# Patient Record
Sex: MALE | Race: WHITE | NOT HISPANIC OR LATINO
[De-identification: names, ages, dates, MRNs, and addresses within clinical notes are randomized per-mention and may not be internally consistent; named-entity substitution may affect disease eponyms.]

---

## 2018-08-20 PROBLEM — Z00.00 ENCOUNTER FOR PREVENTIVE HEALTH EXAMINATION: Status: ACTIVE | Noted: 2018-08-20

## 2018-08-22 ENCOUNTER — APPOINTMENT (OUTPATIENT)
Dept: ORTHOPEDIC SURGERY | Facility: CLINIC | Age: 63
End: 2018-08-22
Payer: COMMERCIAL

## 2018-08-22 VITALS
DIASTOLIC BLOOD PRESSURE: 79 MMHG | HEIGHT: 67 IN | WEIGHT: 220 LBS | HEART RATE: 69 BPM | BODY MASS INDEX: 34.53 KG/M2 | SYSTOLIC BLOOD PRESSURE: 124 MMHG

## 2018-08-22 DIAGNOSIS — S39.012A STRAIN OF MUSCLE, FASCIA AND TENDON OF LOWER BACK, INITIAL ENCOUNTER: ICD-10-CM

## 2018-08-22 DIAGNOSIS — M47.817 SPONDYLOSIS W/OUT MYELOPATHY OR RADICULOPATHY, LUMBOSACRAL REGION: ICD-10-CM

## 2018-08-22 PROCEDURE — 72100 X-RAY EXAM L-S SPINE 2/3 VWS: CPT

## 2018-08-22 PROCEDURE — 99203 OFFICE O/P NEW LOW 30 MIN: CPT

## 2021-03-08 ENCOUNTER — APPOINTMENT (OUTPATIENT)
Dept: ORTHOPEDIC SURGERY | Facility: CLINIC | Age: 66
End: 2021-03-08
Payer: MEDICARE

## 2021-03-08 DIAGNOSIS — M19.049 PRIMARY OSTEOARTHRITIS, UNSPECIFIED HAND: ICD-10-CM

## 2021-03-08 PROCEDURE — 20550 NJX 1 TENDON SHEATH/LIGAMENT: CPT | Mod: LT

## 2021-03-08 PROCEDURE — 73130 X-RAY EXAM OF HAND: CPT | Mod: LT

## 2021-03-08 PROCEDURE — 99214 OFFICE O/P EST MOD 30 MIN: CPT | Mod: 25

## 2021-03-08 RX ORDER — TAMSULOSIN HYDROCHLORIDE 0.4 MG/1
0.4 CAPSULE ORAL
Qty: 90 | Refills: 0 | Status: ACTIVE | COMMUNITY
Start: 2020-08-06

## 2021-03-08 RX ORDER — LINACLOTIDE 145 UG/1
145 CAPSULE, GELATIN COATED ORAL
Qty: 90 | Refills: 0 | Status: ACTIVE | COMMUNITY
Start: 2020-10-08

## 2021-03-08 RX ORDER — BUPROPION HYDROCHLORIDE 300 MG/1
300 TABLET, EXTENDED RELEASE ORAL
Qty: 90 | Refills: 0 | Status: ACTIVE | COMMUNITY
Start: 2020-07-03

## 2021-03-08 RX ORDER — DULAGLUTIDE 1.5 MG/.5ML
1.5 INJECTION, SOLUTION SUBCUTANEOUS
Qty: 6 | Refills: 0 | Status: ACTIVE | COMMUNITY
Start: 2020-12-21

## 2021-03-08 RX ORDER — CLONIDINE 0.1 MG/24H
0.1 PATCH, EXTENDED RELEASE TRANSDERMAL
Qty: 12 | Refills: 0 | Status: ACTIVE | COMMUNITY
Start: 2020-01-09

## 2021-03-08 RX ORDER — FINASTERIDE 5 MG/1
5 TABLET, FILM COATED ORAL
Qty: 90 | Refills: 0 | Status: ACTIVE | COMMUNITY
Start: 2020-08-06

## 2021-03-08 RX ORDER — SILDENAFIL 100 MG/1
100 TABLET, FILM COATED ORAL
Qty: 18 | Refills: 0 | Status: ACTIVE | COMMUNITY
Start: 2020-08-06

## 2021-03-12 NOTE — PHYSICAL EXAM
[de-identified] : Patient is WDWN, alert, and in no acute distress. Breathing is unlabored. He is grossly oriented to person, place, \par and time. \par \par Left Wrist:Tender over radial styloid\par FROM\par Normal sensation\par Positive Finkelstein test\par  [de-identified] : AP, lateral and oblique views of the left wrist were obtained today and revealed no abnormalities. No acute fracture. No dislocation. Mild CMC arthritis.

## 2021-03-12 NOTE — HISTORY OF PRESENT ILLNESS
[Right] : right hand dominant [FreeTextEntry1] : DYLAN LE is a RHD 65 year male who presents for initial evaluation of left wrist pain x 3-4 weeks.  The pain is in the radial portion of the wrist.  It is painful to twist anything.  It is tender to touch.  He has pain with gripping. He has difficult lifting anything heavy. He reports pain in the thumb that has persisted for the last few weeks. Patient confirms DM.

## 2021-03-12 NOTE — DISCUSSION/SUMMARY
[FreeTextEntry1] : The underlying pathophysiology was reviewed with the patient. XR films were reviewed with the patient. Discussed at length the nature of the patient’s condition. Their left wrist symptoms appear secondary to decor veins tendonitis.\par \par Treatment options were discussed including; observation, NSAIDS, analgesics, injection(s) and bracing\par \par   The patient wishes to proceed with a cortisone injection at this time. Under sterile precautions, an injection of  0.5 cc 1% lidocaine with 0.25 cc of Kenalog and 0.25 cc of Dexamethasone was administered into the left first extensor compartment.  The patient tolerated the procedure well. Rest and apply ice.  The patient was advised to soak the hand in warm water and Epsom salts.  Topical analgesics as needed.  NSAIDs as tolerated.\par \par Patient can continue activities as tolerated. All questions answered, understanding verbalized. Patient in agreement with plan of care.

## 2021-12-17 ENCOUNTER — APPOINTMENT (OUTPATIENT)
Dept: ORTHOPEDIC SURGERY | Facility: CLINIC | Age: 66
End: 2021-12-17

## 2021-12-21 ENCOUNTER — NON-APPOINTMENT (OUTPATIENT)
Age: 66
End: 2021-12-21

## 2021-12-22 ENCOUNTER — APPOINTMENT (OUTPATIENT)
Dept: ORTHOPEDIC SURGERY | Facility: CLINIC | Age: 66
End: 2021-12-22
Payer: MEDICARE

## 2021-12-22 VITALS — WEIGHT: 211 LBS | HEART RATE: 70 BPM | BODY MASS INDEX: 33.12 KG/M2 | OXYGEN SATURATION: 95 % | HEIGHT: 67 IN

## 2021-12-22 DIAGNOSIS — M99.08 SEGMENTAL AND SOMATIC DYSFUNCTION OF RIB CAGE: ICD-10-CM

## 2021-12-22 PROCEDURE — 71100 X-RAY EXAM RIBS UNI 2 VIEWS: CPT | Mod: RT

## 2021-12-22 PROCEDURE — 99214 OFFICE O/P EST MOD 30 MIN: CPT

## 2021-12-22 RX ORDER — LIDOCAINE 4 G/100G
4 PATCH TOPICAL
Qty: 14 | Refills: 0 | Status: ACTIVE | COMMUNITY
Start: 2021-12-22 | End: 1900-01-01

## 2022-09-15 ENCOUNTER — APPOINTMENT (OUTPATIENT)
Dept: ORTHOPEDIC SURGERY | Facility: CLINIC | Age: 67
End: 2022-09-15

## 2022-09-15 PROCEDURE — 20550 NJX 1 TENDON SHEATH/LIGAMENT: CPT

## 2022-09-15 PROCEDURE — 99213 OFFICE O/P EST LOW 20 MIN: CPT | Mod: 25

## 2022-09-15 NOTE — ADDENDUM
[FreeTextEntry1] : I, Nelda Cox wrote this note acting as a scribe for Dr. Sanchez Romano on Sep 15, 2022.

## 2022-09-15 NOTE — DISCUSSION/SUMMARY
[FreeTextEntry1] : The underlying pathophysiology was reviewed with the patient. Discussed at length the nature of the patient’s condition. 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 The skin was prepped with alcohol and sprayed with Ethyl Chloride. An injection of 0.5 cc 1% Lidocaine without epinephrine, 0.25 cc Kenalog 40 mg, and 0.25 cc Dexamethasone was administered into the LEFT first extensor compartment (2/3). The patient tolerated the procedure well. Rest and apply ice. \par As a diabetic, he understands that this will likely elevate his blood sugars in the short-term.\par \par All questions answered, understanding verbalized. Patient in agreement with plan of care. Follow up as needed.

## 2022-09-15 NOTE — PHYSICAL EXAM
[de-identified] : Patient is WDWN, alert, and in no acute distress. Breathing is unlabored. He is grossly oriented to person, place, and time.\par \par He is accompanied by his wife today.\par \par Left Wrist:\par There is tenderness over the first dorsal compartment at the level of the radial styloid. Swelling is localized to the area. There are no visible deformities. The ROM is full in all planes with no crepitus. There is no joint instability on provocative testing. Sensation is normal.\par Range of Motion: Pain is elicited with resisted radial deviation of the wrist. \par Tests/Signs: Finkelstein's test is positive. [de-identified] : no imaging today

## 2022-09-15 NOTE — HISTORY OF PRESENT ILLNESS
[Right] : right hand dominant [FreeTextEntry1] : Patient is a 67 year old male who presents with complaints of left wrist pain which has been ongoing for about 1-/12 years. He was initially treated in the office on 3/8/21 with a cortisone injection to the left first dorsal compartment. He got relief from the injection until recently. He would like another injection today. \par \par He has a medical history of Type II Diabetes.

## 2022-09-15 NOTE — END OF VISIT
[FreeTextEntry3] : All medical record entries made by the Scribe were at my,  Dr. Sanchez Romano MD., direction and personally dictated by me on 09/15/2022. I have personally reviewed the chart and agree that the record accurately reflects my personal performance of the history, physical exam, assessment and plan.

## 2023-07-27 ENCOUNTER — NON-APPOINTMENT (OUTPATIENT)
Age: 68
End: 2023-07-27

## 2023-07-31 ENCOUNTER — APPOINTMENT (OUTPATIENT)
Dept: ORTHOPEDIC SURGERY | Facility: CLINIC | Age: 68
End: 2023-07-31
Payer: MEDICARE

## 2023-07-31 DIAGNOSIS — M65.4 RADIAL STYLOID TENOSYNOVITIS [DE QUERVAIN]: ICD-10-CM

## 2023-07-31 PROCEDURE — 20550 NJX 1 TENDON SHEATH/LIGAMENT: CPT | Mod: LT

## 2023-07-31 NOTE — PHYSICAL EXAM
[de-identified] : Patient is WDWN, alert, and in no acute distress. Breathing is unlabored. He is grossly oriented to person, place, and time.\par  \par  He is accompanied by his wife today.\par  \par  Left Wrist:\par  There is tenderness over the first dorsal compartment at the level of the radial styloid. Swelling is localized to the area. There are no visible deformities. The ROM is full in all planes with no crepitus. There is no joint instability on provocative testing. Sensation is normal.\par  Range of Motion: Pain is elicited with resisted radial deviation of the wrist. \par  Tests/Signs: Finkelstein's test is positive. [de-identified] : no imaging today

## 2023-07-31 NOTE — ADDENDUM
[FreeTextEntry1] : I, Nelda Cox wrote this note acting as a scribe for Dr. Sanchez Romano on Jul 31, 2023.

## 2023-07-31 NOTE — HISTORY OF PRESENT ILLNESS
[Right] : right hand dominant [FreeTextEntry1] : Patient is a 68 year old male who presents with complaints of left wrist pain which has been ongoing for about 2 years. He was initially treated in the office on 3/8/21 with a cortisone injection to the left first dorsal compartment and then a repeat cortisone injection (#2) on 9/15/22. He returns on 7/31/23 and is having recurrent pain and symptoms as of 2 weeks ago. He would like another cortisone injection today.   He has a medical history of Type II Diabetes.

## 2023-07-31 NOTE — END OF VISIT
[FreeTextEntry3] : All medical record entries made by the Scribe were at my,  Dr. Sanchez Romano MD., direction and personally dictated by me on 07/31/2023. I have personally reviewed the chart and agree that the record accurately reflects my personal performance of the history, physical exam, assessment and plan.

## 2023-07-31 NOTE — DISCUSSION/SUMMARY
[FreeTextEntry1] : The underlying pathophysiology was reviewed with the patient. Discussed at length the nature of the patient's condition. The left wrist symptoms appear secondary to De Quervain's tendinitis.  At this time, given the recurrence of his symptoms at the left wrist, he opted to proceed with a repeat cortisone injection at the first dorsal compartment. He understands that as this is his third cortisone injection, if his symptoms recur, further treatment options will consist of surgical release. I explained to him that there is a lifetime limit of 3 cortisone injections per wrist along the first dorsal compartment.  The patient wishes to proceed with a cortisone injection at this time. The skin was prepped with alcohol and sprayed with Ethyl Chloride. An injection of 0.5 cc 1% Lidocaine without epinephrine, 0.25 cc Kenalog 40 mg, and 0.25 cc Dexamethasone was administered into the LEFT first extensor compartment (2/3). The patient tolerated the procedure well. Rest and apply ice.  As a diabetic, he understands that this will likely elevate his blood sugars in the short-term.  All questions answered, understanding verbalized. Patient in agreement with plan of care. Follow up as needed.

## 2023-12-20 ENCOUNTER — APPOINTMENT (OUTPATIENT)
Dept: ORTHOPEDIC SURGERY | Facility: CLINIC | Age: 68
End: 2023-12-20

## 2023-12-27 ENCOUNTER — APPOINTMENT (OUTPATIENT)
Dept: ORTHOPEDIC SURGERY | Facility: CLINIC | Age: 68
End: 2023-12-27
Payer: MEDICARE

## 2023-12-27 VITALS — BODY MASS INDEX: 27 KG/M2 | HEIGHT: 67 IN | WEIGHT: 172 LBS

## 2023-12-27 PROCEDURE — 73502 X-RAY EXAM HIP UNI 2-3 VIEWS: CPT

## 2023-12-27 PROCEDURE — 99213 OFFICE O/P EST LOW 20 MIN: CPT

## 2024-01-01 NOTE — HISTORY OF PRESENT ILLNESS
[de-identified] : The patient is a 68 year male who presents for initial evaluation of left hip, pelvis and buttocks pain. He reports that the pain started 2 weeks ago. He reports that the pain is constant. No numbness or tingling. No back pains. He is retired. He ambulates with a cane.   He is a diabetic.  Hx of high blood pressure that is currently under control.

## 2024-01-01 NOTE — DISCUSSION/SUMMARY
[de-identified] : 68 year  old with left hip pain that appears to be left hip hamstring proximal origin tear. We discussed at length the nature of the patient's condition. We discussed all options and conservative measures, such as ice, NSAIDs, physical therapy, exercise limitations, and injections.   An MRI of the left hip was ordered to evaluate for hamstring proximal origin tear. Patient will follow-up to review the results.  l  The patient understood and verbally agreed to the treatment plan. All of their questions were answered. The patient should call the office if they have any questions or experience worsening symptoms.

## 2024-01-01 NOTE — PHYSICAL EXAM
[de-identified] : Constitutional: Well nourished, well developed and in no acute distress Psychiatric: Alert and oriented to time place and person. Appropriate affect. Skin: Head, neck, arms and lower extremities: no lesions or discoloration HEENT: Normocephalic, EOM intact, Nasal septum midline, Respiratory: Unlabored respirations,no audible wheezing ,no tachypnea, no cyanosis Cardiovascular: No leg swelling no ankle edema no JVD, pulse regular Vascular: No calf or thigh tenderness,  Peripheral pulses: intact Lymphatics: No groin adenopathy,no lymphedema lower or upper extremities  Left Hip:  Inspection/Palpation: no tenderness, no swelling, no deformity Leg Lengths: equal Passive range of motion: flexion 115 degrees, internal 30 degrees, external 40 degrees, abduction 50 degrees, adduction 30 degrees pain free.   Strength: resisted hip flexion 5/5, resisted hip abduction 5/5 Skin : no erythema, no ecchymosis Sensation : sensation to light touch intact Vascular Exam : no edema, no cyanosis, dorsalis pedis artery pulse 2+, posterior tibial artery pulse 2+  positive 50 degrees  tender 2 verusity  [de-identified] : AP and lateral views of the left  hip and pelvis were obtained today, 12/27/203, and revealed pelvis joint space maintained, latera femoral head loosency with sclerotic rim.

## 2024-01-01 NOTE — ADDENDUM
[FreeTextEntry1] : I, Luz Marina Sofia, acted solely as a scribe for Dr. Owen Haider MD on this date 12/27/2023 .   All medical record entries made by the Scribe were at my, Dr. Owen Haider MD, direction and personally dictated by me on 12/27/2023. I have reviewed the chart and agree that the record accurately reflects my personal performance of the history, physical exam, assessment and plan. I have also personally directed, reviewed, and agreed with the chart.

## 2024-01-02 ENCOUNTER — APPOINTMENT (OUTPATIENT)
Dept: MRI IMAGING | Facility: CLINIC | Age: 69
End: 2024-01-02
Payer: MEDICARE

## 2024-01-02 ENCOUNTER — OUTPATIENT (OUTPATIENT)
Dept: OUTPATIENT SERVICES | Facility: HOSPITAL | Age: 69
LOS: 1 days | End: 2024-01-02
Payer: MEDICARE

## 2024-01-02 DIAGNOSIS — Z00.00 ENCOUNTER FOR GENERAL ADULT MEDICAL EXAMINATION WITHOUT ABNORMAL FINDINGS: ICD-10-CM

## 2024-01-02 PROCEDURE — 73721 MRI JNT OF LWR EXTRE W/O DYE: CPT

## 2024-01-02 PROCEDURE — 73721 MRI JNT OF LWR EXTRE W/O DYE: CPT | Mod: 26,LT,MH

## 2024-01-05 ENCOUNTER — NON-APPOINTMENT (OUTPATIENT)
Age: 69
End: 2024-01-05

## 2024-01-08 ENCOUNTER — NON-APPOINTMENT (OUTPATIENT)
Age: 69
End: 2024-01-08

## 2024-01-10 ENCOUNTER — APPOINTMENT (OUTPATIENT)
Dept: ORTHOPEDIC SURGERY | Facility: CLINIC | Age: 69
End: 2024-01-10
Payer: MEDICARE

## 2024-01-10 VITALS — HEIGHT: 67 IN | WEIGHT: 170 LBS | BODY MASS INDEX: 26.68 KG/M2

## 2024-01-10 PROCEDURE — 99213 OFFICE O/P EST LOW 20 MIN: CPT

## 2024-01-10 NOTE — ADDENDUM
[FreeTextEntry1] : I, Whitney Victor, acted solely as a scribe for Dr. Owen Haider MD on this date 01/10/2024 .  All medical record entries made by the Scribe were at my, Dr. Owen Haider MD , direction and personally dictated by me on 01/10/2024 . I have reviewed the chart and agree that the record accurately reflects my personal performance of the history, physical exam, assessment and plan. I have also personally directed, reviewed, and agreed with the chart.

## 2024-01-10 NOTE — PHYSICAL EXAM
[de-identified] : Constitutional: Well nourished, well developed and in no acute distress Psychiatric: Alert and oriented to time place and person. Appropriate affect. Skin: Head, neck, arms and lower extremities: no lesions or discoloration HEENT: Normocephalic, EOM intact, Nasal septum midline, Respiratory: Unlabored respirations,no audible wheezing ,no tachypnea, no cyanosis Cardiovascular: No leg swelling no ankle edema no JVD, pulse regular Vascular: No calf or thigh tenderness,  Peripheral pulses: intact Lymphatics: No groin adenopathy,no lymphedema lower or upper extremities  Left Hip:  Inspection/Palpation: no tenderness, no swelling, no deformity Leg Lengths: equal Passive range of motion: flexion 115 degrees, internal 30 degrees, external 40 degrees, abduction 50 degrees, adduction 30 degrees pain free.   Strength: resisted hip flexion 5/5, resisted hip abduction 5/5 Skin : no erythema, no ecchymosis Sensation : sensation to light touch intact Vascular Exam : no edema, no cyanosis, dorsalis pedis artery pulse 2+, posterior tibial artery pulse 2+  positive 50 degrees  tender 2 verusity  [de-identified] : EXAM: [id number] - MR HIP LT  - ORDERED BY: KELLY VANN PROCEDURE DATE:  01/02/2024  INTERPRETATION:  LEFT HIP MRI  CLINICAL INDICATION: Left hip pain TECHNIQUE: Multiplanar, multisequence MRI was obtained of the left hip. COMPARISON: None available.  FINDINGS:  LABRUM: Mild under sided fraying of the posterosuperior and superior labrum. No displaced labral tear. No paralabral cyst is identified. HYALINE CARTILAGE AND SUBCHONDRAL BONE: Preserved cartilage JOINT MORPHOLOGY: Mild prominence the superior femoral head-neck junction. MUSCLE AND TENDONS: Tendinosis at the origin of the conjoined tendon with mild surrounding edema. No tear or retraction. The remaining tendons are preserved No muscular edema noted. SYNOVIUM/JOINT FLUID: Small fluid in the left hip joint. BONE MARROW: No fracture NEUROVASCULAR STRUCTURES: Preserved INTRAPELVIC AND PERIPHERAL SOFT TISSUES: Enlarged prostate invaginating into the inferior urinary bladder. Small left fat-containing inguinal hernia.  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 No tear or retraction. Mild left hip degenerative change.  --- End of Report --- _________________ AP and lateral views of the left  hip and pelvis were obtained today, 12/27/203, and revealed pelvis joint space maintained, latera femoral head loosency with sclerotic rim.

## 2024-01-10 NOTE — DISCUSSION/SUMMARY
[de-identified] : 68 year  old with left hip mild proximal hamstring tendinosis. We discussed at length the nature of the patient's condition. We discussed all options and conservative measures, such as ice, NSAIDs, physical therapy, exercise limitations, and injections.   MRI of the left hip was discussed in great detail with the patient today. Patient with Mild proximal hamstring tendinosis at the conjoined tendon origin. No tear or retraction. Mild left hip degenerative change.  At this time, I recommend a course of physical therapy for the left hip to improve ROM and strength. A script was provided in office today. FU if symptoms persist.   The patient understood and verbally agreed to the treatment plan. All of their questions were answered. The patient should call the office if they have any questions or experience worsening symptoms.

## 2024-01-10 NOTE — HISTORY OF PRESENT ILLNESS
[de-identified] : DYLAN LE is a 68 year old male who presents for follow up evaluation of left hip, pelvis and buttocks pain. He reports that the pain started 2 weeks ago. He reports that the pain is constant. No numbness or tingling. No back pains. He is retired. He ambulates with a cane.  He presents today with MRI results for review. He reports continue left hip pain.  He is a diabetic.  Hx of high blood pressure that is currently under control.

## 2024-02-22 ENCOUNTER — RX RENEWAL (OUTPATIENT)
Age: 69
End: 2024-02-22

## 2024-03-20 ENCOUNTER — RX RENEWAL (OUTPATIENT)
Age: 69
End: 2024-03-20

## 2024-04-03 ENCOUNTER — APPOINTMENT (OUTPATIENT)
Dept: ORTHOPEDIC SURGERY | Facility: CLINIC | Age: 69
End: 2024-04-03

## 2024-04-24 ENCOUNTER — APPOINTMENT (OUTPATIENT)
Dept: ORTHOPEDIC SURGERY | Facility: CLINIC | Age: 69
End: 2024-04-24
Payer: MEDICARE

## 2024-04-24 VITALS — HEIGHT: 67 IN | WEIGHT: 170 LBS | BODY MASS INDEX: 26.68 KG/M2

## 2024-04-24 DIAGNOSIS — S76.312A STRAIN OF MUSCLE, FASCIA AND TENDON OF THE POSTERIOR MUSCLE GROUP AT THIGH LEVEL, LEFT THIGH, INITIAL ENCOUNTER: ICD-10-CM

## 2024-04-24 PROCEDURE — 99213 OFFICE O/P EST LOW 20 MIN: CPT

## 2024-04-24 NOTE — ADDENDUM
[FreeTextEntry1] : I, Whitney Victor, acted solely as a scribe for Dr. Owen Haider MD on this date  04/24/2024  All medical record entries made by the Scribe were at my, Dr. Owen Haider MD , direction and personally dictated by me on 04/24/2024. I have reviewed the chart and agree that the record accurately reflects my personal performance of the history, physical exam, assessment and plan. I have also personally directed, reviewed, and agreed with the chart.

## 2024-04-24 NOTE — HISTORY OF PRESENT ILLNESS
[de-identified] : DYLAN LE is a 68 year old male who presents for follow up evaluation of left hip, pelvis and buttocks pain. He reports that the pain started 2 weeks ago. He reports that the pain is constant. No numbness or tingling. No back pains. He is retired. He ambulates with a cane. He presents today with MRI results for review. Since his last visit, he attended PT 2-3x a week noting improvements in symptoms.  He is a diabetic.  Hx of high blood pressure that is currently under control.

## 2024-04-24 NOTE — DISCUSSION/SUMMARY
[de-identified] : 68 year  old with left hip mild proximal hamstring tendinosis. We discussed at length the nature of the patient's condition. We discussed all options and conservative measures, such as ice, NSAIDs, physical therapy, exercise limitations, and injections.   At this time, I recommend a course of physical therapy for the left hip to improve ROM and strength. A script was provided in office today. FU if symptoms persist.   The patient understood and verbally agreed to the treatment plan. All of their questions were answered. The patient should call the office if they have any questions or experience worsening symptoms.

## 2024-04-24 NOTE — PHYSICAL EXAM
[de-identified] : Constitutional: Well nourished, well developed and in no acute distress Psychiatric: Alert and oriented to time place and person. Appropriate affect. Skin: Head, neck, arms and lower extremities: no lesions or discoloration HEENT: Normocephalic, EOM intact, Nasal septum midline, Respiratory: Unlabored respirations,no audible wheezing ,no tachypnea, no cyanosis Cardiovascular: No leg swelling no ankle edema no JVD, pulse regular Vascular: No calf or thigh tenderness,  Peripheral pulses: intact Lymphatics: No groin adenopathy,no lymphedema lower or upper extremities  Left Hip:  Inspection/Palpation: no tenderness, no swelling, no deformity Leg Lengths: equal Passive range of motion: flexion 115 degrees, internal 30 degrees, external 40 degrees, abduction 50 degrees, adduction 30 degrees pain free.   Strength: resisted hip flexion 5/5, resisted hip abduction 5/5 Skin : no erythema, no ecchymosis Sensation : sensation to light touch intact Vascular Exam : no edema, no cyanosis, dorsalis pedis artery pulse 2+, posterior tibial artery pulse 2+  positive 50 degrees  tender 2 verusity  [de-identified] : EXAM: [id number] - MR HIP LT  - ORDERED BY: KELLY VANN PROCEDURE DATE:  01/02/2024  INTERPRETATION:  LEFT HIP MRI  CLINICAL INDICATION: Left hip pain TECHNIQUE: Multiplanar, multisequence MRI was obtained of the left hip. COMPARISON: None available.  FINDINGS:  LABRUM: Mild under sided fraying of the posterosuperior and superior labrum. No displaced labral tear. No paralabral cyst is identified. HYALINE CARTILAGE AND SUBCHONDRAL BONE: Preserved cartilage JOINT MORPHOLOGY: Mild prominence the superior femoral head-neck junction. MUSCLE AND TENDONS: Tendinosis at the origin of the conjoined tendon with mild surrounding edema. No tear or retraction. The remaining tendons are preserved No muscular edema noted. SYNOVIUM/JOINT FLUID: Small fluid in the left hip joint. BONE MARROW: No fracture NEUROVASCULAR STRUCTURES: Preserved INTRAPELVIC AND PERIPHERAL SOFT TISSUES: Enlarged prostate invaginating into the inferior urinary bladder. Small left fat-containing inguinal hernia.  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 No tear or retraction. Mild left hip degenerative change.  --- End of Report --- _________________ AP and lateral views of the left  hip and pelvis were obtained today, 12/27/203, and revealed pelvis joint space maintained, latera femoral head loosency with sclerotic rim.

## 2024-04-26 ENCOUNTER — RX RENEWAL (OUTPATIENT)
Age: 69
End: 2024-04-26

## 2024-04-26 RX ORDER — MELOXICAM 7.5 MG/1
7.5 TABLET ORAL
Qty: 30 | Refills: 0 | Status: ACTIVE | COMMUNITY
Start: 2024-01-22 | End: 1900-01-01

## 2024-05-29 ENCOUNTER — APPOINTMENT (OUTPATIENT)
Dept: ORTHOPEDIC SURGERY | Facility: CLINIC | Age: 69
End: 2024-05-29
Payer: MEDICARE

## 2024-05-29 VITALS — BODY MASS INDEX: 25.9 KG/M2 | WEIGHT: 165 LBS | HEIGHT: 67 IN

## 2024-05-29 DIAGNOSIS — M25.552 PAIN IN LEFT HIP: ICD-10-CM

## 2024-05-29 PROCEDURE — 99213 OFFICE O/P EST LOW 20 MIN: CPT

## 2024-05-29 NOTE — PHYSICAL EXAM
[de-identified] : Constitutional: Well nourished, well developed and in no acute distress Psychiatric: Alert and oriented to time place and person. Appropriate affect. Skin: Head, neck, arms and lower extremities: no lesions or discoloration HEENT: Normocephalic, EOM intact, Nasal septum midline, Respiratory: Unlabored respirations,no audible wheezing ,no tachypnea, no cyanosis Cardiovascular: No leg swelling no ankle edema no JVD, pulse regular Vascular: No calf or thigh tenderness,  Peripheral pulses: intact Lymphatics: No groin adenopathy,no lymphedema lower or upper extremities  Left Hip:  Inspection/Palpation: no tenderness, no swelling, no deformity Leg Lengths: equal Passive range of motion: flexion 115 degrees, internal 30 degrees, external 40 degrees, abduction 50 degrees, adduction 30 degrees pain free.   Strength: resisted hip flexion 5/5, resisted hip abduction 5/5 Skin : no erythema, no ecchymosis Sensation : sensation to light touch intact Vascular Exam : no edema, no cyanosis, dorsalis pedis artery pulse 2+, posterior tibial artery pulse 2+  positive 50 degrees  Left ischial tuberosity non tender  Right Hip:  Inspection/Palpation: no tenderness, no swelling, no deformity Leg Lengths: equal Passive range of motion: satisfactory pain free  Strength: resisted hip flexion 5/5, resisted hip abduction 5/5 Skin : no erythema, no ecchymosis Sensation : sensation to light touch intact Vascular Exam : no edema, no cyanosis, dorsalis pedis artery pulse 2+, posterior tibial artery pulse 2+  [de-identified] : EXAM: [id number] - MR HIP LT  - ORDERED BY: KELLY VANN PROCEDURE DATE:  01/02/2024  INTERPRETATION:  LEFT HIP MRI  CLINICAL INDICATION: Left hip pain TECHNIQUE: Multiplanar, multisequence MRI was obtained of the left hip. COMPARISON: None available.  FINDINGS:  LABRUM: Mild under sided fraying of the posterosuperior and superior labrum. No displaced labral tear. No paralabral cyst is identified. HYALINE CARTILAGE AND SUBCHONDRAL BONE: Preserved cartilage JOINT MORPHOLOGY: Mild prominence the superior femoral head-neck junction. MUSCLE AND TENDONS: Tendinosis at the origin of the conjoined tendon with mild surrounding edema. No tear or retraction. The remaining tendons are preserved No muscular edema noted. SYNOVIUM/JOINT FLUID: Small fluid in the left hip joint. BONE MARROW: No fracture NEUROVASCULAR STRUCTURES: Preserved INTRAPELVIC AND PERIPHERAL SOFT TISSUES: Enlarged prostate invaginating into the inferior urinary bladder. Small left fat-containing inguinal hernia.  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 No tear or retraction. Mild left hip degenerative change.  --- End of Report ---

## 2024-05-29 NOTE — DISCUSSION/SUMMARY
[de-identified] : 68 year  old with left hip mild proximal hamstring tendinosis. We discussed at length the nature of the patient's condition. We discussed all options and conservative measures, such as ice, NSAIDs, physical therapy, exercise limitations, and injections.   85% healed from physical therapy  The patient understood and verbally agreed to the treatment plan. All of their questions were answered. The patient should call the office if they have any questions or experience worsening symptoms.

## 2024-05-29 NOTE — ADDENDUM
[FreeTextEntry1] : I, Whitney Victor, acted solely as a scribe for Dr. Owen Haider MD on this date  05/29/2024  All medical record entries made by the Scribe were at my, Dr. Owen Haider MD , direction and personally dictated by me on 05/29/2024. I have reviewed the chart and agree that the record accurately reflects my personal performance of the history, physical exam, assessment and plan. I have also personally directed, reviewed, and agreed with the chart.

## 2024-05-29 NOTE — HISTORY OF PRESENT ILLNESS
[de-identified] : DYLAN LE is a 68 year old male who presents for follow up evaluation of left hip, pelvis and buttocks pain. He reports that the pain started mid-December after getting out of bed one morning.  He reports that the pain is constant. No numbness or tingling. No back pains. He is retired. He ambulates with a cane. Since his last visit, he's been attending PT 2-3x a week noting improvements in symptoms.  He reports an 85% improvement of his symptoms.   He also complains of right leg pain over the last few months.  Of note, PMHx He is a diabetic.  Hx of high blood pressure that is currently under control.

## 2024-09-10 ENCOUNTER — APPOINTMENT (OUTPATIENT)
Dept: ORTHOPEDIC SURGERY | Facility: CLINIC | Age: 69
End: 2024-09-10
Payer: MEDICARE

## 2024-09-10 VITALS
BODY MASS INDEX: 26.37 KG/M2 | SYSTOLIC BLOOD PRESSURE: 99 MMHG | HEART RATE: 46 BPM | HEIGHT: 67 IN | DIASTOLIC BLOOD PRESSURE: 59 MMHG | WEIGHT: 168 LBS

## 2024-09-10 DIAGNOSIS — S76.311A STRAIN OF MUSCLE, FASCIA AND TENDON OF THE POSTERIOR MUSCLE GROUP AT THIGH LEVEL, RIGHT THIGH, INITIAL ENCOUNTER: ICD-10-CM

## 2024-09-10 PROCEDURE — 99213 OFFICE O/P EST LOW 20 MIN: CPT

## 2024-09-10 NOTE — HISTORY OF PRESENT ILLNESS
[de-identified] : 69-year-old male presents for evaluation of right thigh pain x 1 month. He denies trauma or injury. He complains of constant sharp pain at the posterior thigh without any particular triggers. He has tried topical pain relievers and Tylenol with some relief. He reports similar pain in the left leg last year, saw Dr. Haider and was treated for hamstring tendinosis with PT.

## 2024-09-10 NOTE — PHYSICAL EXAM
[LE] : Sensory: Intact in bilateral lower extremities [DP] : dorsalis pedis 2+ and symmetric bilaterally [PT] : posterior tibial 2+ and symmetric bilaterally [Normal] : Alert and in no acute distress [Poor Appearance] : well-appearing [Acute Distress] : not in acute distress [Obese] : not obese [de-identified] : The patient has no respiratory distress. Mood and affect are normal. The patient is alert and oriented to person, place and time. There is no pain with active or passive rotation of the hips.  There is no tenderness of either hip.  He has tight hamstring muscles bilaterally.  He has mild posterior right thigh pain with extension of the knee.  The knees are stable.  The calves are soft and nontender.  The skin is intact.  There is no lymphedema.

## 2024-09-10 NOTE — DISCUSSION/SUMMARY
[de-identified] : The patient has sustained an injury to the hamstrings and his right thigh.  He had previous injury on the left.  He has bilateral tight hamstrings.  I have discussed the pathology, natural history and treatment options with him.  He is referred for physical therapy.  He can be reevaluated in 2 months.

## 2024-10-02 ENCOUNTER — NON-APPOINTMENT (OUTPATIENT)
Age: 69
End: 2024-10-02

## 2025-01-10 ENCOUNTER — APPOINTMENT (OUTPATIENT)
Dept: ORTHOPEDIC SURGERY | Facility: CLINIC | Age: 70
End: 2025-01-10
Payer: MEDICARE

## 2025-01-10 VITALS — HEIGHT: 67 IN | BODY MASS INDEX: 25.9 KG/M2 | WEIGHT: 165 LBS

## 2025-01-10 DIAGNOSIS — S76.311A STRAIN OF MUSCLE, FASCIA AND TENDON OF THE POSTERIOR MUSCLE GROUP AT THIGH LEVEL, RIGHT THIGH, INITIAL ENCOUNTER: ICD-10-CM

## 2025-01-10 PROCEDURE — 99213 OFFICE O/P EST LOW 20 MIN: CPT

## 2025-01-24 ENCOUNTER — INPATIENT (INPATIENT)
Facility: HOSPITAL | Age: 70
LOS: 3 days | Discharge: HOME | DRG: 690 | End: 2025-01-28
Attending: STUDENT IN AN ORGANIZED HEALTH CARE EDUCATION/TRAINING PROGRAM | Admitting: STUDENT IN AN ORGANIZED HEALTH CARE EDUCATION/TRAINING PROGRAM
Payer: MEDICARE

## 2025-01-24 VITALS
DIASTOLIC BLOOD PRESSURE: 73 MMHG | TEMPERATURE: 98 F | RESPIRATION RATE: 20 BRPM | OXYGEN SATURATION: 96 % | WEIGHT: 175.05 LBS | HEIGHT: 68 IN | SYSTOLIC BLOOD PRESSURE: 107 MMHG | HEART RATE: 89 BPM

## 2025-01-24 DIAGNOSIS — N39.0 URINARY TRACT INFECTION, SITE NOT SPECIFIED: ICD-10-CM

## 2025-01-24 LAB
ALBUMIN SERPL ELPH-MCNC: 3.6 G/DL — SIGNIFICANT CHANGE UP (ref 3.3–5)
ALBUMIN SERPL ELPH-MCNC: 3.9 G/DL — SIGNIFICANT CHANGE UP (ref 3.3–5)
ALP SERPL-CCNC: 41 U/L — SIGNIFICANT CHANGE UP (ref 40–120)
ALP SERPL-CCNC: 47 U/L — SIGNIFICANT CHANGE UP (ref 40–120)
ALT FLD-CCNC: 19 U/L — SIGNIFICANT CHANGE UP (ref 10–45)
ALT FLD-CCNC: 25 U/L — SIGNIFICANT CHANGE UP (ref 10–45)
ANION GAP SERPL CALC-SCNC: 11 MMOL/L — SIGNIFICANT CHANGE UP (ref 5–17)
ANION GAP SERPL CALC-SCNC: 15 MMOL/L — SIGNIFICANT CHANGE UP (ref 5–17)
APPEARANCE UR: ABNORMAL
APTT BLD: 28.5 SEC — SIGNIFICANT CHANGE UP (ref 24.5–35.6)
AST SERPL-CCNC: 14 U/L — SIGNIFICANT CHANGE UP (ref 10–40)
AST SERPL-CCNC: 20 U/L — SIGNIFICANT CHANGE UP (ref 10–40)
BACTERIA # UR AUTO: ABNORMAL /HPF
BASOPHILS # BLD AUTO: 0 K/UL — SIGNIFICANT CHANGE UP (ref 0–0.2)
BASOPHILS NFR BLD AUTO: 0 % — SIGNIFICANT CHANGE UP (ref 0–2)
BILIRUB SERPL-MCNC: 0.5 MG/DL — SIGNIFICANT CHANGE UP (ref 0.2–1.2)
BILIRUB SERPL-MCNC: 0.6 MG/DL — SIGNIFICANT CHANGE UP (ref 0.2–1.2)
BILIRUB UR-MCNC: NEGATIVE — SIGNIFICANT CHANGE UP
BUN SERPL-MCNC: 34 MG/DL — HIGH (ref 7–23)
BUN SERPL-MCNC: 40 MG/DL — HIGH (ref 7–23)
CALCIUM SERPL-MCNC: 9.6 MG/DL — SIGNIFICANT CHANGE UP (ref 8.4–10.5)
CALCIUM SERPL-MCNC: 9.8 MG/DL — SIGNIFICANT CHANGE UP (ref 8.4–10.5)
CAST: 0 /LPF — SIGNIFICANT CHANGE UP (ref 0–4)
CHLORIDE SERPL-SCNC: 101 MMOL/L — SIGNIFICANT CHANGE UP (ref 96–108)
CHLORIDE SERPL-SCNC: 103 MMOL/L — SIGNIFICANT CHANGE UP (ref 96–108)
CO2 SERPL-SCNC: 25 MMOL/L — SIGNIFICANT CHANGE UP (ref 22–31)
CO2 SERPL-SCNC: 27 MMOL/L — SIGNIFICANT CHANGE UP (ref 22–31)
COLOR SPEC: YELLOW — SIGNIFICANT CHANGE UP
CREAT SERPL-MCNC: 1.17 MG/DL — SIGNIFICANT CHANGE UP (ref 0.5–1.3)
CREAT SERPL-MCNC: 1.29 MG/DL — SIGNIFICANT CHANGE UP (ref 0.5–1.3)
D DIMER BLD IA.RAPID-MCNC: 221 NG/ML DDU — SIGNIFICANT CHANGE UP
DIFF PNL FLD: ABNORMAL
EGFR: 60 ML/MIN/1.73M2 — SIGNIFICANT CHANGE UP
EGFR: 67 ML/MIN/1.73M2 — SIGNIFICANT CHANGE UP
EOSINOPHIL # BLD AUTO: 0 K/UL — SIGNIFICANT CHANGE UP (ref 0–0.5)
EOSINOPHIL NFR BLD AUTO: 0 % — SIGNIFICANT CHANGE UP (ref 0–6)
FLUAV AG NPH QL: SIGNIFICANT CHANGE UP
FLUBV AG NPH QL: SIGNIFICANT CHANGE UP
GAS PNL BLDV: SIGNIFICANT CHANGE UP
GAS PNL BLDV: SIGNIFICANT CHANGE UP
GLUCOSE SERPL-MCNC: 139 MG/DL — HIGH (ref 70–99)
GLUCOSE SERPL-MCNC: 176 MG/DL — HIGH (ref 70–99)
GLUCOSE UR QL: >=1000 MG/DL
HCT VFR BLD CALC: 43.2 % — SIGNIFICANT CHANGE UP (ref 39–50)
HGB BLD-MCNC: 14.4 G/DL — SIGNIFICANT CHANGE UP (ref 13–17)
INR BLD: 1.19 RATIO — HIGH (ref 0.85–1.16)
KETONES UR-MCNC: 15 MG/DL
LEUKOCYTE ESTERASE UR-ACNC: ABNORMAL
LYMPHOCYTES # BLD AUTO: 0.22 K/UL — LOW (ref 1–3.3)
LYMPHOCYTES # BLD AUTO: 0.9 % — LOW (ref 13–44)
MANUAL SMEAR VERIFICATION: SIGNIFICANT CHANGE UP
MCHC RBC-ENTMCNC: 32.3 PG — SIGNIFICANT CHANGE UP (ref 27–34)
MCHC RBC-ENTMCNC: 33.3 G/DL — SIGNIFICANT CHANGE UP (ref 32–36)
MCV RBC AUTO: 96.9 FL — SIGNIFICANT CHANGE UP (ref 80–100)
MONOCYTES # BLD AUTO: 1.26 K/UL — HIGH (ref 0–0.9)
MONOCYTES NFR BLD AUTO: 5.1 % — SIGNIFICANT CHANGE UP (ref 2–14)
NEUTROPHILS # BLD AUTO: 23.15 K/UL — HIGH (ref 1.8–7.4)
NEUTROPHILS NFR BLD AUTO: 88.9 % — HIGH (ref 43–77)
NEUTS BAND # BLD: 5.1 % — SIGNIFICANT CHANGE UP (ref 0–8)
NEUTS BAND NFR BLD: 5.1 % — SIGNIFICANT CHANGE UP (ref 0–8)
NITRITE UR-MCNC: NEGATIVE — SIGNIFICANT CHANGE UP
NT-PROBNP SERPL-SCNC: 1324 PG/ML — HIGH (ref 0–300)
PH UR: 5.5 — SIGNIFICANT CHANGE UP (ref 5–8)
PLAT MORPH BLD: NORMAL — SIGNIFICANT CHANGE UP
PLATELET # BLD AUTO: 248 K/UL — SIGNIFICANT CHANGE UP (ref 150–400)
POTASSIUM SERPL-MCNC: 3.4 MMOL/L — LOW (ref 3.5–5.3)
POTASSIUM SERPL-MCNC: 3.5 MMOL/L — SIGNIFICANT CHANGE UP (ref 3.5–5.3)
POTASSIUM SERPL-SCNC: 3.4 MMOL/L — LOW (ref 3.5–5.3)
POTASSIUM SERPL-SCNC: 3.5 MMOL/L — SIGNIFICANT CHANGE UP (ref 3.5–5.3)
PROT SERPL-MCNC: 6.3 G/DL — SIGNIFICANT CHANGE UP (ref 6–8.3)
PROT SERPL-MCNC: 7 G/DL — SIGNIFICANT CHANGE UP (ref 6–8.3)
PROT UR-MCNC: 30 MG/DL
PROTHROM AB SERPL-ACNC: 13.5 SEC — HIGH (ref 9.9–13.4)
RBC # BLD: 4.46 M/UL — SIGNIFICANT CHANGE UP (ref 4.2–5.8)
RBC # FLD: 14.5 % — SIGNIFICANT CHANGE UP (ref 10.3–14.5)
RBC BLD AUTO: SIGNIFICANT CHANGE UP
RBC CASTS # UR COMP ASSIST: 4 /HPF — SIGNIFICANT CHANGE UP (ref 0–4)
REVIEW: SIGNIFICANT CHANGE UP
RSV RNA NPH QL NAA+NON-PROBE: SIGNIFICANT CHANGE UP
SARS-COV-2 RNA SPEC QL NAA+PROBE: SIGNIFICANT CHANGE UP
SODIUM SERPL-SCNC: 141 MMOL/L — SIGNIFICANT CHANGE UP (ref 135–145)
SODIUM SERPL-SCNC: 141 MMOL/L — SIGNIFICANT CHANGE UP (ref 135–145)
SP GR SPEC: >1.03 — HIGH (ref 1–1.03)
SQUAMOUS # UR AUTO: 5 /HPF — SIGNIFICANT CHANGE UP (ref 0–5)
TROPONIN T, HIGH SENSITIVITY RESULT: 27 NG/L — SIGNIFICANT CHANGE UP (ref 0–51)
UROBILINOGEN FLD QL: 0.2 MG/DL — SIGNIFICANT CHANGE UP (ref 0.2–1)
WBC # BLD: 24.63 K/UL — HIGH (ref 3.8–10.5)
WBC # FLD AUTO: 24.63 K/UL — HIGH (ref 3.8–10.5)
WBC UR QL: 220 /HPF — HIGH (ref 0–5)

## 2025-01-24 PROCEDURE — 76770 US EXAM ABDO BACK WALL COMP: CPT | Mod: 26

## 2025-01-24 PROCEDURE — 99223 1ST HOSP IP/OBS HIGH 75: CPT

## 2025-01-24 PROCEDURE — 99285 EMERGENCY DEPT VISIT HI MDM: CPT | Mod: FS

## 2025-01-24 PROCEDURE — 71045 X-RAY EXAM CHEST 1 VIEW: CPT | Mod: 26

## 2025-01-24 RX ORDER — POTASSIUM CHLORIDE 750 MG/1
40 TABLET, EXTENDED RELEASE ORAL ONCE
Refills: 0 | Status: COMPLETED | OUTPATIENT
Start: 2025-01-24 | End: 2025-01-24

## 2025-01-24 RX ORDER — SODIUM CHLORIDE 9 G/ML
2100 INJECTION, SOLUTION INTRAVENOUS ONCE
Refills: 0 | Status: COMPLETED | OUTPATIENT
Start: 2025-01-24 | End: 2025-01-24

## 2025-01-24 RX ORDER — ACETAMINOPHEN 160 MG/5ML
650 SUSPENSION ORAL ONCE
Refills: 0 | Status: COMPLETED | OUTPATIENT
Start: 2025-01-24 | End: 2025-01-24

## 2025-01-24 RX ORDER — CEFTRIAXONE 250 MG/1
1000 INJECTION, POWDER, FOR SOLUTION INTRAMUSCULAR; INTRAVENOUS ONCE
Refills: 0 | Status: COMPLETED | OUTPATIENT
Start: 2025-01-24 | End: 2025-01-24

## 2025-01-24 RX ADMIN — ACETAMINOPHEN 650 MILLIGRAM(S): 160 SUSPENSION ORAL at 12:21

## 2025-01-24 RX ADMIN — POTASSIUM CHLORIDE 40 MILLIEQUIVALENT(S): 750 TABLET, EXTENDED RELEASE ORAL at 17:08

## 2025-01-24 RX ADMIN — SODIUM CHLORIDE 2100 MILLILITER(S): 9 INJECTION, SOLUTION INTRAVENOUS at 12:49

## 2025-01-24 RX ADMIN — CEFTRIAXONE 100 MILLIGRAM(S): 250 INJECTION, POWDER, FOR SOLUTION INTRAMUSCULAR; INTRAVENOUS at 12:22

## 2025-01-24 NOTE — H&P ADULT - PROBLEM SELECTOR PLAN 2
CXR w/o consolidation, denies cough/dyspnea  Spo2% documented 89, placed on 2-3L NC  breathing comfortably on exam, speaking in full sentences  reports hx RAMANDEEP not using device  wife reports patient w/ baseline low SpO2%   DDimer not elevated  - wean O2 as tolerates ON  - repeat troponin to trend   - proBNP elevated however exam euvolemic, would contact PCP in AM (patient has followed w/ his PCP since 1990s) to discuss whether outpatient w/u undergone for this, if not would consider TTE/CT chest for further evaluation and continued outpatient pulm f/u re RAMANDEEP

## 2025-01-24 NOTE — H&P ADULT - NSHPLABSRESULTS_GEN_ALL_CORE
pocus< from: POCUS ED Kidney and Bladder (01.24.25 @ 20:13) >    INTERPRETATION:  Interpretation:  Focused grayscale imaging of the kidneys and bladder was performed. The   kidneys and bladder were visualized and scanned in both transverse and   longitudinal planes.    The right kidney no hydronephrosis present.  The left kidney no hydronephrosis present.  Renal cyst present    IMPRESSION:  No hydronephrosis present in the left and right kidneys.    < end of copied text > pocus< from: POCUS ED Kidney and Bladder (01.24.25 @ 20:13) >    INTERPRETATION:  Interpretation:  Focused grayscale imaging of the kidneys and bladder was performed. The   kidneys and bladder were visualized and scanned in both transverse and   longitudinal planes.    The right kidney no hydronephrosis present.  The left kidney no hydronephrosis present.  Renal cyst present    IMPRESSION:  No hydronephrosis present in the left and right kidneys.    < end of copied text >    no EKG in physical chart or commented upon by ED provider

## 2025-01-24 NOTE — H&P ADULT - PROBLEM SELECTOR PLAN 5
- CXR read as L chest wall deformity, exam unrevealing, contacted radiology to discuss who states likely congenital or prior fx. Would recommend PCP f/u on d/c

## 2025-01-24 NOTE — ED PROVIDER NOTE - PROGRESS NOTE DETAILS
Discussed all results with pt and wife at bedside. Will admit to medicine for further care. Pt agreeable. Roberta Rubio PA-C

## 2025-01-24 NOTE — H&P ADULT - NSHPPHYSICALEXAM_GEN_ALL_CORE
PHYSICAL EXAM:  GENERAL: NAD, well-groomed, well-developed, pleasant to interview  HEAD: Atraumatic, Normocephalic  EYES: PERRL, conjunctiva and sclera clear  ENMT: No tonsillar erythema, exudates, or enlargement; Moist mucous membranes  NECK: Supple w/o JVD  NERVOUS SYSTEM: Alert & Oriented X4, Good concentration; Motor Strength 5/5 B/L upper and lower extremities. Sensation equal/intact b/l UE and LE. CN II-XII grossly intact.   CHEST/LUNG: Clear to auscultation bilaterally; No rales, rhonchi, wheezing, or rubs  HEART: Regular rate and rhythm; No murmurs, rubs, or gallops  ABDOMEN: Soft, Nontender, Nondistended; Bowel sounds present. No guarding, rebound tenderness, or rigidity. No CVA tenderness.   EXTREMITIES: 2+ Peripheral Pulses, No edema/warmth/erythema/tenderness to palpation b/l LE

## 2025-01-24 NOTE — H&P ADULT - PROBLEM SELECTOR PLAN 1
SIRS+ (temp, WBC) i/s/o UA c/f infxn (has hx UTIs, denies MDRO)   CXR w/o explanation  no abd/CVA tenderness on exam, POCUS ED kidney & bladder w/o overt abn  - c/w CTX pending Cx, is s/p 2.1L IVF in ED, lactate improved, encourage PO hydration (BUN elevation, repeat in AM for improvement)   - monitor VS  - outpatient f/u proteineuria/blood SIRS+ (temp, WBC) i/s/o UA c/f infxn (has hx UTIs, denies MDRO)   CXR w/o explanation  no abd/CVA tenderness on exam, POCUS ED kidney & bladder w/o overt abn  - c/w CTX pending Cx, is s/p 2.1L IVF in ED, lactate improved, encourage PO hydration (BUN elevation, repeat in AM for improvement)   - monitor VS  - outpatient f/u proteinuria/blood

## 2025-01-24 NOTE — H&P ADULT - PROBLEM SELECTOR PLAN 11
- repeat PT/INR in AM   - HSQ VTE PPx  - fall, aspiration precautions   - CC/DASH/TLC diet pending RN dysphagia screen  - disposition pending course - f/u screening EKG  - repeat PT/INR in AM   - HSQ VTE PPx  - fall, aspiration precautions   - CC/DASH/TLC diet pending RN dysphagia screen  - disposition pending course  - does not report BPH however on prostate medications, will c/w finasteride and flomax however holding doxazosin ON until confirm w/ PCP in AM that he is on both tamsulosin and doxazosin - f/u screening EKG  - repeat PT/INR in AM   - HSQ VTE PPx  - fall, aspiration precautions   - CC/DASH/TLC diet pending RN dysphagia screen  - disposition pending course  - does not report BPH however on prostate medications, will c/w finasteride and flomax however holding doxazosin ON until confirm w/ PCP in AM that he is on both tamsulosin and doxazosin, ctm BS per routine

## 2025-01-24 NOTE — ED PROVIDER NOTE - OBJECTIVE STATEMENT
70 y/o male with pmhx of DM, HTN presents to the ED BIBA for urinary frequency and urgency x few days. States that his wife and daughter called the ambulance today because yesterday he had to urinate so much that he was unable to make it to the bathroom. Denies any pain with urinating or blood in the urine. States that he has had utis in the past but does not feel like he has one currently. Today states that he does not have any more urinary complaints. He would not have came to the ED if it was not for his wife calling the ambulance. He denies any headache, fever, chills, chest pain, cough, abdominal pain, back pain, n/v/d. Normal bowel movements.

## 2025-01-24 NOTE — ED ADULT NURSE NOTE - TEMPLATE LIST FOR HEAD TO TOE ASSESSMENT
Chief complaint:   Chief Complaint   Patient presents with   • Medicare Wellness Visit     Vitals:  Visit Vitals  /62   Pulse 78   Ht 5' (1.524 m)   Wt 67.6 kg   BMI 29.10 kg/m²     HISTORY OF PRESENT ILLNESS     HPI This is a 69 year old female who presents today for a Medicare Wellness Visit. No illicit drug use.     She tested + for COVID on 5/24/2021 in ER; she had lower plt and liver levels were elevated, as well as + anemia on CBC. Repeat CMP in 6/2021 all wnl. She is due to repeat CBC. She is pending echo, since + shortness of breath w/ exertion ? COVID related sx. She is having heart palpitations; she sees cardiology service q year.    Pt has chronic pain; she has been lowering opioid rx gradually. She declines PM referral; she declines spinal injections, + herniated disc, too. UDS 12/27/2016; + opioid, negative for benzodiazapene, illicits. pt has seen PM services and PT in the past, no help w/ injections. UDS on 7/13/20 + Clonazepam, Morphine and Oxycodone, no other illicits, appropriate for pt. she agrees to lower opioid use Percocet 5/325 po bid and Morphine SR 15 mg po bid. PDMP was reviewed; no issues of abuse noted. She has Zanaflex on med list.     She stopped Prozac. She increased dose of Prozac and she had paranoia, hallucinations, fearful all the time. No dose increase of Effexor needed; she is taking max dose 225 mg/day. She has trialed Zoloft in the past w/o help for anxiety and depression.   She continues w/ Clonazepam for anxiety.Pt never trialed Bupropion; she agrees to doing so. She stopped Buspirone (Buspar) use in the past. Effexor is helping w/ anxiety and depression.     GERD sx are under control w/ Prilosec. No more than usual joint, ms aches w/ statin use.     Lab/studies reviewed by me w/ pt from 6/2021; Cholesterol levels are at goal. Fasting glucose level is normal; negative for diabetes. Liver and kidney levels are normal, stable. Urine microalbumin is negligible. Hemoglobin  A1c level is acceptable at 5.8%. Repeat A1c level in 1 year. Thyroid level is normal, stable.    No f/c, no HENT sx; she continues w/ Singulair. no n/v, no diarrhea or constipation, no urinary sx. No rashes or skin changes. No easy bruising. No slurred speech, weakness, dropping items, gait imbalance, visual changes.     My supervising physician is Dr. Maia Smith. Pt declines Shingrix vaccine #2 due to cost.     ASSESSMENT:  1. Medicare annual wellness visit, subsequent    2. Combined hyperlipidemia    3. Other seasonal allergic rhinitis    4. Gastroesophageal reflux disease    5. Generalized anxiety disorder    6. Recurrent major depressive disorder, in partial remission (CMS/[facility])    7. Encounter for screening mammogram for malignant neoplasm of breast    8. Abnormal CBC    9. Non-seasonal allergic rhinitis due to other allergic trigger    10. Chronic pain syndrome    11. Gastroesophageal reflux disease without esophagitis      PLAN:  Orders Placed This Encounter   • Mammo Screening Bilateral   • CBC with Automated Differential   • lovastatin (MEVACOR) 40 MG tablet   • montelukast (SINGULAIR) 10 MG tablet   • omeprazole (PriLOSEC) 40 MG capsule   • buPROPion (WELLBUTRIN SR) 100 MG 12 hr tablet       Return in about 1 year (around 7/1/2022) for Medicare Wellness Visit, OV 40 minutes, 6 week 40 min appt.  [phone] for COVID vaccine appt.    Praish my advocate tabatha franklin on cell phone, laptop or desktop or tablet  Schedule lab appt, fasting, a few days prior to next office visit w/ me in 1 year; Schedule lab appt, non-fasting, and mmg asap on the same day.   Call back for refills.  Continue to push water intake  Keep all other appt as scheduled  Continue all other rx as dir    Other significant issues:  Patient Active Problem List   Diagnosis   • Insomnia   • GERD (gastroesophageal reflux disease)   • Cervicalgia   • Generalized anxiety disorder   • Obesity (BMI 30-39.9)   • Coronary artery disease involving  native coronary artery of native heart   • IFG (impaired fasting glucose)   • Chronic pain syndrome   • OAB (overactive bladder)   • Essential (primary) hypertension   • Primary osteoarthritis involving multiple joints   • Allergic rhinitis   • Medication management   • Other specified peripheral vascular diseases    • Vitamin D deficiency   • Recurrent major depressive disorder, in partial remission (CMS/HCC)   • RLS (restless legs syndrome)   • Osteoporosis of multiple sites   • Lumbar facet arthropathy   • Multilevel degenerative disc disease   • CRI (chronic renal insufficiency), stage 2 (mild)   • Combined hyperlipidemia       PREVENTIVE HEALTH:    Diet:  fair  Exercise:  minimal  Self exam:  monthly  Advance directive:  discussed and advised the patient to complete one   Last eye exam:  over a year ago and advised to schedule  Hearing:  normal by whisper test    No exam data present    Screening for abdominal aortic aneurysm:  completed in the past and no aneurysm was evident 6/2019 CT abd/pelvis; abdominal aorta is noted without evidence of aneurysm formation    Tobacco history:   reports that she has never smoked. She has never used smokeless tobacco.  Alcohol history:   reports no history of alcohol use.    Last lipid testing:  at goal    Cholesterol (mg/dL)   Date Value   06/28/2021 203 (H)   06/01/2020 202 (H)     LDL (mg/dL)   Date Value   06/28/2021 105   06/01/2020 106     HDL (mg/dL)   Date Value   06/28/2021 69   06/01/2020 72     Triglycerides (mg/dL)   Date Value   06/28/2021 143   06/01/2020 120       Last glucose:  normal    Glucose (mg/dL)   Date Value   06/28/2021 98   05/24/2021 81       Immunizations:  TDaP/Td:  up to date  Pneumococcal:  up to date  Influenza:  not indicated  Zoster:  declines    Immunization History   Administered Date(s) Administered   • Influenza, high dose seasonal, preservative-free 10/02/2017, 09/25/2018, 09/30/2019   • Influenza, injectable, quadrivalent 10/08/2015,  09/02/2016   • Influenza, seasonal, injectable, trivalent 01/25/2007, 02/17/2009, 11/09/2009, 09/15/2010, 11/03/2011, 08/02/2012, 10/14/2013, 11/07/2014   • Novel Influenza M8S3-99 01/11/2010   • Novel Influenza L2S6-14, Unspecified Formulation 01/11/2010   • Pneumococcal Conjugate 13 valent 12/27/2016   • Pneumococcal polysaccharide, adult, 23 valent 10/01/2003, 01/03/2011, 12/28/2017   • Shingles Zoster (Shingrix) 09/30/2019   • Td:Adult type tetanus/diphtheria 09/30/2019   • Tdap 07/24/2007   • Zoster Shingles 11/30/2012       Health Maintenance:  Colonoscopy:  6/2019 and follow up was recommended in 3 years  DEXA scan:  6/2020 and last exam showed osteopenia  PAP smear:  no longer indicated due to hysterectomy for benign reasons  Mammogram:  due    Health Maintenance   Topic Date Due   • COVID-19 Vaccine (1) Never done   • Shingles Vaccine (3 of 3) 11/25/2019   • Influenza Vaccine (1) 08/01/2021   • Breast Cancer Screening  06/08/2022   • Colonoscopy Risk  06/20/2022   • DM/CKD Microalbumin  06/28/2022   • DM/CKD GFR  06/28/2022   • Medicare Wellness Visit  07/01/2022   • DTaP/Tdap/Td Vaccine (3 - Td or Tdap) 09/30/2029   • Osteoporosis Screening  Completed   • Hepatitis C Screening  Completed   • Pneumococcal Vaccine 65+  Completed   • Hepatitis B Vaccine  Aged Out   • Meningococcal Vaccine  Aged Out   • HPV Vaccine  Aged Out       Current providers: Care Team was updated  Patient Care Team:  Laura Fajardo PA-C as PCP - General (Physician Assistant)  Miguelangel Shelley MD (Neurological Surgery)    There is no evidence of cognitive impairment by direct observation.    I reviewed the Health Risk Assessment in its entirety and it was entered into the electronic health record.    The following items on the Medicare Health Risk Assessment were found to be positive  1.) Do you have an Advance directive, living will, or power of  for health care document that contains your wishes for end of life care?: No     4.)  During the past 4 weeks, what was the hardest physical activity you could do for at least 2 minutes?: Light     5.) Do you do moderate to strenuous exercise (brisk walk) for about 20 minutes for 3 or more days per week?: No, I usually do not exercise this much     6 a.) How many servings of Fruits and Vegetables do you have each day ( 1 serving = 1 piece of fruit, 1/2 cup fruits or vegetables): None     6 b.) How many servings of High Fiber / Whole Grain Foods to you have each day ( 1 serving = 1 cup cold cereal, 1/2 cup cooked cereal, 1 slice bread): 1 per day     8.) During the past 4 weeks, has your physical and emotional health limited your social activities with family, friends, neighbors, or other groups?: Moderately     11a.) Bladder Control problems (urine leaking): Often     11d.) Bodily pain: Always     11e.) Tiredness or Fatigue: Often     11f.) Feeling stressed or overwhelmed: Always     11g.) Anger or frustration: Always        14.) During the past 4 weeks, was someone available to help if you needed and wanted help?: No, not at all     15.) How confident are you that you can control and manage most of your health problems?: Somewhat confident     pt encouraged to complete POA packet. Start walking program for 30 minutes on most days of the week. Pt will increase f/v, whole grain/high fiber intake 4-5/day. pt is socially distancing d/t COVID epidemic. She declines PM referral. Pt has chronic pain. She declines rx for bladder issues. She continues w/ Effexor po once a day, helps w/ anxiety, depression. She agrees to Wellbutrin use. She asks neighbor for help; dtrs are unavailable to assist pt.    Vision and hearing screens performed and reviewed  Advance Directive document: No  The patient has the following Advance Directive documents:     Cognitive Assessment: no evidence of cognitive dysfunction by direct observation    Recent PHQ 2/9 Score    PHQ 2:  Date Adult PHQ 2 Score Adult PHQ 2 Interpretation    7/1/2021 6 Further screening needed       PHQ 9:  Date Adult PHQ 9 Score Adult PHQ 9 Interpretation   7/1/2021 18 Moderately Severe Depression       DEPRESSION ASSESSMENT/PLAN:  pt will start Wellbutrin.     Body mass index is 29.1 kg/m².    BMI ASSESSMENT/PLAN:  Patient BMI is within normal range.     Needed Screening/Treatment:   Annual mammogram   Needed follow up:  None      Other significant problems:  Patient Active Problem List    Diagnosis Date Noted   • Combined hyperlipidemia 03/22/2019     Priority: Low   • CRI (chronic renal insufficiency), stage 2 (mild)      Priority: Low   • Lumbar facet arthropathy 09/25/2018     Priority: Low   • Multilevel degenerative disc disease 09/25/2018     Priority: Low   • Osteoporosis of multiple sites      Priority: Low   • RLS (restless legs syndrome) 07/11/2017     Priority: Low   • Recurrent major depressive disorder, in partial remission (CMS/HCC) 04/03/2017     Priority: Low   • Vitamin D deficiency 02/06/2017     Priority: Low   • Medication management 12/27/2016     Priority: Low   • Other specified peripheral vascular diseases  12/27/2016     Priority: Low   • IFG (impaired fasting glucose)      Priority: Low     3/2019 A1C 6.1%     • Chronic pain syndrome      Priority: Low     back pain, past seen by PM&R     • OAB (overactive bladder)      Priority: Low   • Essential (primary) hypertension      Priority: Low   • Primary osteoarthritis involving multiple joints      Priority: Low   • Allergic rhinitis      Priority: Low   • Coronary artery disease involving native coronary artery of native heart 04/01/2016     Priority: Low   • Obesity (BMI 30-39.9) 04/30/2014     Priority: Low   • Insomnia      Priority: Low   • GERD (gastroesophageal reflux disease)      Priority: Low   • Cervicalgia      Priority: Low   • Generalized anxiety disorder      Priority: Low       PAST MEDICAL, FAMILY AND SOCIAL HISTORY     Medications:  Current Outpatient Medications    Medication   • omeprazole (PriLOSEC) 40 MG capsule   • venlafaxine XR (EFFEXOR XR) 75 MG 24 hr capsule   • morphine, IMM REL, (MSIR) 15 MG tablet   • oxyCODONE-acetaminophen (PERCOCET) 5-325 MG per tablet   • clonazePAM (KlonoPIN) 1 MG tablet   • tiZANidine (ZANAFLEX) 4 MG tablet   • DISPENSE   • Probiotic Product (PROBIOTIC DAILY PO)   • Multiple Vitamin (MULTI-VITAMIN DAILY PO)   • Cholecalciferol (VITAMIN D PO)   • aspirin 81 MG tablet   • lovastatin (MEVACOR) 40 MG tablet   • montelukast (SINGULAIR) 10 MG tablet   • buPROPion (WELLBUTRIN SR) 100 MG 12 hr tablet   • amLODIPine (NORVASC) 5 MG tablet     No current facility-administered medications for this visit.       Allergies:  ALLERGIES:   Allergen Reactions   • Erythromycin      Severe abdominal pain   • Biaxin [Clarithromycin]      Mouth Sores   • Ibuprofen      Throat Swelling   • Azithromycin      Mouth Sores   • Glycopyrrolate Other (See Comments)   • Metoclopramide Hcl Other (See Comments)   • Septra [Bactrim] RASH   • Penicillins RASH       Past Medical  History/Surgeries:  Past Medical History:   Diagnosis Date   • Allergic rhinitis    • Cataract     s/p surgery   • Cervicalgia    • Combined hyperlipidemia 3/22/2019   • Coronary artery disease involving native coronary artery of native heart 4/1/2016   • CRI (chronic renal insufficiency), stage 2 (mild)    • Essential (primary) hypertension    • Generalized anxiety disorder    • GERD (gastroesophageal reflux disease)    • IFG (impaired fasting glucose)    • Insomnia    • Lumbar facet arthropathy 9/25/2018   • Mixed hyperlipidemia    • Multilevel degenerative disc disease 9/25/2018   • OAB (overactive bladder)    • Obesity (BMI 30-39.9) 4/30/2014   • Osteoporosis of multiple sites 11/2017   • Other chronic pain     back pain, past seen by PM&R   • Primary osteoarthritis involving multiple joints    • Recurrent major depressive disorder, in partial remission (CMS/HCC) 4/3/2017   • RLS (restless legs  syndrome) 7/11/2017   • Unspecified vitamin D deficiency 2/6/2017   • Vitamin D deficiency 2/6/2017       Past Surgical History:   Procedure Laterality Date   • Anesth,knee arthroscopy      x3   • Appendectomy     • Carpal tunnel release  07/27/2010    x2 on right and 1 on left   • Cervical laminectomy  01/01/1995    C6-7   • Cholecystectomy     • Colonoscopy w biopsy  05/19/2011   • Colonoscopy w/ polypectomy  06/20/2019    Repeat in 3 years - Dr. Olson   • Dexa bone density axial skeleton  11/2017   • Esophagogastroduodenoscopy  11/21/2011   • Esophagogastroduodenoscopy  06/20/2019   • Eye surgery  5-20-15    right cataract with IOL   • Eye surgery Bilateral 10-22-15    Bilateral upper lid blepharoplasty   • Gi endoscopic ultrasound  06/20/2019   • Hysterectomy  02/17/2009   • Ptca  10/4/13    cath x2 no stents   • Remove tonsils/adenoids,<11 y/o     • Shoulder arthroscopy Left    • Tubal ligation         Family History:  Family History   Problem Relation Age of Onset   • Heart disease Mother    • Diabetes Mother    • Cataracts Mother    • Cancer, Lung Mother    • Stroke Father    • Cataracts Father    • Heart disease Father    • Heart disease Sister    • Glaucoma Sister    • Cataracts Sister    • Diabetes Maternal Grandmother        Social History:  Social History     Tobacco Use   • Smoking status: Never Smoker   • Smokeless tobacco: Never Used   Substance Use Topics   • Alcohol use: No     Alcohol/week: 0.0 standard drinks       REVIEW OF SYSTEMS     Review of Systems As above.    PHYSICAL EXAM     Physical Exam     Constitutional: She appears well-developed and well-nourished. No distress. Pleasant, friendly demeanor. + fatigue. Better affect. Nervous.   HENT:   Head: Normocephalic and atraumatic. Short, wavy, light blonde hair.  Eyes: Conjunctivae are normal.   BL ears: External ear normal. No decreased hearing is noted.   Neck: Normal range of motion. Neck supple.   Cardiovascular: VSS.    Pulmonary/Chest: Effort normal. No respiratory distress.   Neurological: She is alert. Gait is limping.   Skin: She is not diaphoretic.   BL LEs: No edema.  Abdomen: + central obesity.    ASSESSMENT/PLAN     As above.     General

## 2025-01-24 NOTE — H&P ADULT - HISTORY OF PRESENT ILLNESS
69M PMH T2DM, HTN, previous UTI p/w urinary frequency.    tmax 38.1C, SpO2% 89-97 on 1-2L NC    WBC 24.63 (neutrophil predominant); PT/INR prolonged; K 3.4, BUN/SCr 34/1.17; HST 27, proBNP 1324; lactate 2.2 -> 1.4     UA mod LE, 220 WBC, many bacteria, ketonuria, glucosuria, protein, small blood (4 RBC)    CXR: Left chest wall deformity. Right basilar atelectasis. No focal consolidation or significant pleural effusion is seen.    POCUS ED Kidney & Bladder: No ydronephrosis present in the left and right kidneys.    s/p CTX 1g, KCl 40meq PO, tylenol 650mg PO, LR 2.1L  69M PMH T2DM, HTN, HLD, MDD, previous UTIs (denies hx MDRO), RAMANDEEP (not using device) p/w urinary frequency. AOx4, reports baseline health when he began to experience urinary frequency accompanied by malodorous urine x 1 day. Not endorsing fever, chills, HA, lightheadedness, visual disturbance, CP, palpitations, cough, SOB, abdominal pain, n/v/d, melena, hematochezia, dysuria, hematuria, change in urinary appearance, or other complaints. Denies recent travel, known sick contact, or immobility. Of note, EMS report described that wife contacted d/t confusion however patient denies any confusion.    tmax 38.1C, SpO2% 89-97 on 1-2L NC    WBC 24.63 (neutrophil predominant); PT/INR prolonged; K 3.4, BUN/SCr 34/1.17; HST 27, proBNP 1324; lactate 2.2 -> 1.4     UA mod LE, 220 WBC, many bacteria, ketonuria, glucosuria, protein, small blood (4 RBC)    CXR: Left chest wall deformity. Right basilar atelectasis. No focal consolidation or significant pleural effusion is seen.    POCUS ED Kidney & Bladder: No ydronephrosis present in the left and right kidneys.    s/p CTX 1g, KCl 40meq PO, tylenol 650mg PO, LR 2.1L  69M PMH T2DM, HTN, HLD, MDD, previous UTIs (denies hx MDRO), RAMANDEEP (not using device) p/w urinary frequency. AOx4, reports baseline health when he began to experience urinary frequency accompanied by malodorous urine x 1 day. Not endorsing fever, chills, HA, lightheadedness, visual disturbance, CP, palpitations, cough, SOB, abdominal pain, n/v/d, melena, hematochezia, dysuria, hematuria, change in urinary appearance, or other complaints. Denies recent travel, known sick contact, or immobility. Of note, EMS report described that wife contacted d/t confusion however patient denies any confusion. Contacted wife via phone for collateral, reports that patient had difficulty navigating himself from bathroom to bed this AM, was slightly disoriented however she states that he is currently back to baseline.    tmax 38.1C, SpO2% 89-97 on 1-2L NC    WBC 24.63 (neutrophil predominant); PT/INR prolonged; K 3.4, BUN/SCr 34/1.17; HST 27, proBNP 1324; lactate 2.2 -> 1.4     UA mod LE, 220 WBC, many bacteria, ketonuria, glucosuria, protein, small blood (4 RBC)    CXR: Left chest wall deformity. Right basilar atelectasis. No focal consolidation or significant pleural effusion is seen.    POCUS ED Kidney & Bladder: No ydronephrosis present in the left and right kidneys.    s/p CTX 1g, KCl 40meq PO, tylenol 650mg PO, LR 2.1L  69M PMH T2DM, HTN, HLD, MDD, previous UTIs (denies hx MDRO), RAMANDEEP (not using device) p/w urinary frequency. AOx4, reports baseline health when he began to experience urinary frequency accompanied by malodorous urine x 1 day. Not endorsing fever, chills, HA, lightheadedness, visual disturbance, CP, palpitations, cough, SOB, abdominal pain, n/v/d, melena, hematochezia, dysuria, hematuria, change in urinary appearance, or other complaints. Denies recent travel, known sick contact, or immobility. Of note, EMS report described that wife contacted d/t confusion however patient denies any confusion. Contacted wife via phone for collateral, reports that patient had difficulty navigating himself from bathroom to bed this AM, was slightly disoriented however she states that he is currently back to baseline.    tmax 38.1C, SpO2% 89-97 on 1-2L NC    WBC 24.63 (neutrophil predominant); PT/INR prolonged; K 3.4, BUN/SCr 34/1.17; HST 27, proBNP 1324; lactate 2.2 -> 1.4     UA mod LE, 220 WBC, many bacteria, ketonuria, glucosuria, protein, small blood (4 RBC)    CXR: Left chest wall deformity. Right basilar atelectasis. No focal consolidation or significant pleural effusion is seen.    POCUS ED Kidney & Bladder: No hydronephrosis present in the left and right kidneys.    s/p CTX 1g, KCl 40meq PO, tylenol 650mg PO, LR 2.1L

## 2025-01-24 NOTE — H&P ADULT - PROBLEM SELECTOR PLAN 3
reported confusion by wife (denied by patient) per wife is resolved and pt AOx4 w/ nonfocal neuro exam at this time. Suspect i/s/o infxn above  - ctm sx/exam, if recurrent would consider obtaining CTH and neuro c/s  - obtain B12, folate, TSH, utox reported confusion by wife (denied by patient) per wife is resolved and pt AOx4 w/ nonfocal neuro exam at this time. Suspect i/s/o infxn above  - ctm sx/exam, if recurrent would consider obtaining CTH and neuro c/s  - obtain B12, folate, TSH, utox  - neuro checks

## 2025-01-24 NOTE — ED PROVIDER NOTE - PHYSICAL EXAMINATION
CONSTITUTIONAL: Patient is awake, alert and oriented x 3. Patient is well appearing and in no acute distress.  HEAD: NCAT  EYES: PERRL bilaterally,   ENT: Airway patent, Nasal mucosa clear.   NECK: Supple,   LUNGS: CTA B/L,   HEART: RRR.+S1S2  ABDOMEN: Soft, non-tender to palpation throughout all four quadrants, no cvat b/l;   MSK: FROM upper and lower ext b/l,   SKIN: No rash or lesions  NEURO:  No focal deficits,

## 2025-01-24 NOTE — H&P ADULT - PROBLEM SELECTOR PLAN 6
- ZIYAD, monitor FS - ZIYAD, monitor FS  - consider d/w PCP reconsideration of home SGLT2i use given recurrent UTI hx

## 2025-01-24 NOTE — H&P ADULT - ASSESSMENT
69M PMH T2DM, HTN, HLD, MDD, previous UTIs (denies hx MDRO), RAMANDEEP (not using device) a/w sepsis 2/2 UTI, c/f metabolic encephalopathy and found to be in hypoxemic in ED c/f acute vs chronic hypoxemic respiratory failure  69M PMH T2DM, HTN, HLD, MDD, previous UTIs (denies hx MDRO), RAMANDEEP (not using device) a/w sepsis 2/2 UTI, c/f metabolic encephalopathy and found to be hypoxemic in ED c/f acute vs chronic hypoxemic respiratory failure

## 2025-01-24 NOTE — H&P ADULT - NSICDXPASTMEDICALHX_GEN_ALL_CORE_FT
PAST MEDICAL HISTORY:  Diabetes     Gastric ulcer     HLD (hyperlipidemia)     Hypertension     MDD (major depressive disorder)

## 2025-01-24 NOTE — H&P ADULT - PROBLEM SELECTOR PLAN 7
- c/w clonidine patch to avoid rebound hypertension, otherwise will hold CCB/ACEi, thiazide diuretic, BB ON given sepsis and re-add as appopriate in AM, monitor BP

## 2025-01-24 NOTE — ED ADULT NURSE NOTE - NSFALLRISKINTERV_ED_ALL_ED

## 2025-01-24 NOTE — PATIENT PROFILE ADULT - FALL HARM RISK - HARM RISK INTERVENTIONS
Assistance OOB with selected safe patient handling equipment/Communicate Risk of Fall with Harm to all staff/Discuss with provider need for PT consult/Monitor gait and stability/Provide patient with walking aids - walker, cane, crutches/Reinforce activity limits and safety measures with patient and family/Tailored Fall Risk Interventions/Use of alarms - bed, chair and/or voice tab/Visual Cue: Yellow wristband and red socks/Bed in lowest position, wheels locked, appropriate side rails in place/Call bell, personal items and telephone in reach/Instruct patient to call for assistance before getting out of bed or chair/Non-slip footwear when patient is out of bed/[place] to call system/Physically safe environment - no spills, clutter or unnecessary equipment/Purposeful Proactive Rounding/Room/bathroom lighting operational, light cord in reach

## 2025-01-24 NOTE — ED ADULT NURSE NOTE - OBJECTIVE STATEMENT
70 y/o Male presents to ED from home via EMS with c/o confusion. PMH of htn, DM, currently in PT for right leg. Pt ambulates with a cane. Per EMS pts wife called due to patient being more weak and "confused" today. Pt endorses increased urination over the last few days. Denies dysuria. Pt currently denies any symptoms. Denies confusion, pain, SOB, CP, HA, N/V/D, abd pain, back pain. Pt is A&Ox3, well appearing/ speaking full sentences without difficulty. Airway patent with spontaneous unlabored breathing, Equal B/L upper and lower extremity strength, skin is warm and normal color for race. No diaphoresis or edema noted. Abd soft, nondistended and non tender to palpation.

## 2025-01-24 NOTE — ED PROVIDER NOTE - ATTENDING APP SHARED VISIT CONTRIBUTION OF CARE
Pt with urinary frequency and urgency, prior UTI in past.  Minimal lower midline ab pain. h/o HTN and DM    PE: well appearing, nontoxic, no respiratory distress.  Neuro nonfocal.  Skin intact. Psych normal mood.  +td SPB  No  lesions    MDM: concern for cystitis r/o sepsis, check cbc r/o anemia or leukocytosis, check bmp to r/o metabolic derangement and lyte imbalance, cultures, IV antibiotics, IV fluids.    Progress Note: reviewed labs, significant leukocytosis, +UA, concern for infective cystitis, given age, co-morbidities, and leukocytosis, pt deemed high risk for developing sepsis, will need inpatient for IV antibiotics, fluids, serial assessments to reduce M&M.

## 2025-01-25 DIAGNOSIS — Z90.49 ACQUIRED ABSENCE OF OTHER SPECIFIED PARTS OF DIGESTIVE TRACT: Chronic | ICD-10-CM

## 2025-01-25 DIAGNOSIS — A41.9 SEPSIS, UNSPECIFIED ORGANISM: ICD-10-CM

## 2025-01-25 DIAGNOSIS — J96.01 ACUTE RESPIRATORY FAILURE WITH HYPOXIA: ICD-10-CM

## 2025-01-25 DIAGNOSIS — G93.41 METABOLIC ENCEPHALOPATHY: ICD-10-CM

## 2025-01-25 DIAGNOSIS — R93.89 ABNORMAL FINDINGS ON DIAGNOSTIC IMAGING OF OTHER SPECIFIED BODY STRUCTURES: ICD-10-CM

## 2025-01-25 DIAGNOSIS — E87.6 HYPOKALEMIA: ICD-10-CM

## 2025-01-25 DIAGNOSIS — F32.9 MAJOR DEPRESSIVE DISORDER, SINGLE EPISODE, UNSPECIFIED: ICD-10-CM

## 2025-01-25 DIAGNOSIS — I10 ESSENTIAL (PRIMARY) HYPERTENSION: ICD-10-CM

## 2025-01-25 DIAGNOSIS — Z29.9 ENCOUNTER FOR PROPHYLACTIC MEASURES, UNSPECIFIED: ICD-10-CM

## 2025-01-25 DIAGNOSIS — E78.5 HYPERLIPIDEMIA, UNSPECIFIED: ICD-10-CM

## 2025-01-25 DIAGNOSIS — E11.9 TYPE 2 DIABETES MELLITUS WITHOUT COMPLICATIONS: ICD-10-CM

## 2025-01-25 DIAGNOSIS — G47.33 OBSTRUCTIVE SLEEP APNEA (ADULT) (PEDIATRIC): ICD-10-CM

## 2025-01-25 LAB
A1C WITH ESTIMATED AVERAGE GLUCOSE RESULT: 6.7 % — HIGH (ref 4–5.6)
ANION GAP SERPL CALC-SCNC: 12 MMOL/L — SIGNIFICANT CHANGE UP (ref 5–17)
BASOPHILS # BLD AUTO: 0.05 K/UL — SIGNIFICANT CHANGE UP (ref 0–0.2)
BASOPHILS NFR BLD AUTO: 0.3 % — SIGNIFICANT CHANGE UP (ref 0–2)
BUN SERPL-MCNC: 35 MG/DL — HIGH (ref 7–23)
CALCIUM SERPL-MCNC: 9.2 MG/DL — SIGNIFICANT CHANGE UP (ref 8.4–10.5)
CHLORIDE SERPL-SCNC: 104 MMOL/L — SIGNIFICANT CHANGE UP (ref 96–108)
CO2 SERPL-SCNC: 24 MMOL/L — SIGNIFICANT CHANGE UP (ref 22–31)
CREAT SERPL-MCNC: 1.24 MG/DL — SIGNIFICANT CHANGE UP (ref 0.5–1.3)
CULTURE RESULTS: ABNORMAL
EGFR: 63 ML/MIN/1.73M2 — SIGNIFICANT CHANGE UP
EOSINOPHIL # BLD AUTO: 0.02 K/UL — SIGNIFICANT CHANGE UP (ref 0–0.5)
EOSINOPHIL NFR BLD AUTO: 0.1 % — SIGNIFICANT CHANGE UP (ref 0–6)
ESTIMATED AVERAGE GLUCOSE: 146 MG/DL — HIGH (ref 68–114)
FOLATE SERPL-MCNC: 13 NG/ML — SIGNIFICANT CHANGE UP
GLUCOSE BLDC GLUCOMTR-MCNC: 113 MG/DL — HIGH (ref 70–99)
GLUCOSE BLDC GLUCOMTR-MCNC: 130 MG/DL — HIGH (ref 70–99)
GLUCOSE BLDC GLUCOMTR-MCNC: 134 MG/DL — HIGH (ref 70–99)
GLUCOSE BLDC GLUCOMTR-MCNC: 135 MG/DL — HIGH (ref 70–99)
GLUCOSE SERPL-MCNC: 118 MG/DL — HIGH (ref 70–99)
HCT VFR BLD CALC: 40.6 % — SIGNIFICANT CHANGE UP (ref 39–50)
HGB BLD-MCNC: 13.7 G/DL — SIGNIFICANT CHANGE UP (ref 13–17)
IMM GRANULOCYTES NFR BLD AUTO: 0.6 % — SIGNIFICANT CHANGE UP (ref 0–0.9)
INR BLD: 1.16 RATIO — SIGNIFICANT CHANGE UP (ref 0.85–1.16)
LYMPHOCYTES # BLD AUTO: 0.78 K/UL — LOW (ref 1–3.3)
LYMPHOCYTES # BLD AUTO: 5 % — LOW (ref 13–44)
MAGNESIUM SERPL-MCNC: 1.8 MG/DL — SIGNIFICANT CHANGE UP (ref 1.6–2.6)
MCHC RBC-ENTMCNC: 33.2 PG — SIGNIFICANT CHANGE UP (ref 27–34)
MCHC RBC-ENTMCNC: 33.7 G/DL — SIGNIFICANT CHANGE UP (ref 32–36)
MCV RBC AUTO: 98.3 FL — SIGNIFICANT CHANGE UP (ref 80–100)
MONOCYTES # BLD AUTO: 1.36 K/UL — HIGH (ref 0–0.9)
MONOCYTES NFR BLD AUTO: 8.7 % — SIGNIFICANT CHANGE UP (ref 2–14)
NEUTROPHILS # BLD AUTO: 13.41 K/UL — HIGH (ref 1.8–7.4)
NEUTROPHILS NFR BLD AUTO: 85.3 % — HIGH (ref 43–77)
NRBC # BLD: 0 /100 WBCS — SIGNIFICANT CHANGE UP (ref 0–0)
NRBC BLD-RTO: 0 /100 WBCS — SIGNIFICANT CHANGE UP (ref 0–0)
PHOSPHATE SERPL-MCNC: 2.3 MG/DL — LOW (ref 2.5–4.5)
PLATELET # BLD AUTO: 213 K/UL — SIGNIFICANT CHANGE UP (ref 150–400)
POTASSIUM SERPL-MCNC: 3.6 MMOL/L — SIGNIFICANT CHANGE UP (ref 3.5–5.3)
POTASSIUM SERPL-SCNC: 3.6 MMOL/L — SIGNIFICANT CHANGE UP (ref 3.5–5.3)
PROTHROM AB SERPL-ACNC: 13.2 SEC — SIGNIFICANT CHANGE UP (ref 9.9–13.4)
RBC # BLD: 4.13 M/UL — LOW (ref 4.2–5.8)
RBC # FLD: 14.6 % — HIGH (ref 10.3–14.5)
SODIUM SERPL-SCNC: 140 MMOL/L — SIGNIFICANT CHANGE UP (ref 135–145)
SPECIMEN SOURCE: SIGNIFICANT CHANGE UP
TROPONIN T, HIGH SENSITIVITY RESULT: 25 NG/L — SIGNIFICANT CHANGE UP (ref 0–51)
TSH SERPL-MCNC: 0.79 UIU/ML — SIGNIFICANT CHANGE UP (ref 0.27–4.2)
VIT B12 SERPL-MCNC: 503 PG/ML — SIGNIFICANT CHANGE UP (ref 232–1245)
WBC # BLD: 15.72 K/UL — HIGH (ref 3.8–10.5)
WBC # FLD AUTO: 15.72 K/UL — HIGH (ref 3.8–10.5)

## 2025-01-25 PROCEDURE — 99233 SBSQ HOSP IP/OBS HIGH 50: CPT

## 2025-01-25 RX ORDER — INSULIN LISPRO 100/ML
VIAL (ML) SUBCUTANEOUS AT BEDTIME
Refills: 0 | Status: DISCONTINUED | OUTPATIENT
Start: 2025-01-25 | End: 2025-01-28

## 2025-01-25 RX ORDER — CLONIDINE HYDROCHLORIDE 0.2 MG/1
1 TABLET ORAL
Refills: 0 | Status: DISCONTINUED | OUTPATIENT
Start: 2025-01-25 | End: 2025-01-28

## 2025-01-25 RX ORDER — ALLOPURINOL 300 MG
300 TABLET ORAL DAILY
Refills: 0 | Status: DISCONTINUED | OUTPATIENT
Start: 2025-01-25 | End: 2025-01-28

## 2025-01-25 RX ORDER — DOXAZOSIN MESYLATE 4 MG
1 TABLET ORAL
Refills: 0 | DISCHARGE

## 2025-01-25 RX ORDER — VENLAFAXINE HCL 75 MG
150 TABLET ORAL DAILY
Refills: 0 | Status: DISCONTINUED | OUTPATIENT
Start: 2025-01-25 | End: 2025-01-28

## 2025-01-25 RX ORDER — GLUCAGON 3 MG/1
1 POWDER NASAL ONCE
Refills: 0 | Status: DISCONTINUED | OUTPATIENT
Start: 2025-01-25 | End: 2025-01-28

## 2025-01-25 RX ORDER — SODIUM PHOSPHATE, DIBASIC, ANHYDROUS, POTASSIUM PHOSPHATE, MONOBASIC, AND SODIUM PHOSPHATE, MONOBASIC, MONOHYDRATE 852; 155; 130 MG/1; MG/1; MG/1
1 TABLET, COATED ORAL ONCE
Refills: 0 | Status: COMPLETED | OUTPATIENT
Start: 2025-01-25 | End: 2025-01-25

## 2025-01-25 RX ORDER — SODIUM CHLORIDE 9 G/ML
1000 INJECTION, SOLUTION INTRAVENOUS
Refills: 0 | Status: DISCONTINUED | OUTPATIENT
Start: 2025-01-25 | End: 2025-01-28

## 2025-01-25 RX ORDER — BUPROPION HYDROCHLORIDE 150 MG/1
1 TABLET, EXTENDED RELEASE ORAL
Refills: 0 | DISCHARGE

## 2025-01-25 RX ORDER — DM/PSEUDOEPHED/ACETAMINOPHEN 10-30-250
25 CAPSULE ORAL ONCE
Refills: 0 | Status: DISCONTINUED | OUTPATIENT
Start: 2025-01-25 | End: 2025-01-28

## 2025-01-25 RX ORDER — ALLOPURINOL 300 MG
1 TABLET ORAL
Refills: 0 | DISCHARGE

## 2025-01-25 RX ORDER — SEMAGLUTIDE 0.25 MG/.5ML
0 INJECTION, SOLUTION SUBCUTANEOUS
Refills: 0 | DISCHARGE

## 2025-01-25 RX ORDER — LINACLOTIDE 290 UG/1
1 CAPSULE, GELATIN COATED ORAL
Refills: 0 | DISCHARGE

## 2025-01-25 RX ORDER — OXYBUTYNIN CHLORIDE 5 MG/1
10 TABLET, EXTENDED RELEASE ORAL
Refills: 0 | Status: DISCONTINUED | OUTPATIENT
Start: 2025-01-25 | End: 2025-01-28

## 2025-01-25 RX ORDER — HEPARIN SODIUM,PORCINE 10000/ML
5000 VIAL (ML) INJECTION EVERY 8 HOURS
Refills: 0 | Status: DISCONTINUED | OUTPATIENT
Start: 2025-01-25 | End: 2025-01-28

## 2025-01-25 RX ORDER — ATORVASTATIN CALCIUM 80 MG/1
10 TABLET, FILM COATED ORAL AT BEDTIME
Refills: 0 | Status: DISCONTINUED | OUTPATIENT
Start: 2025-01-25 | End: 2025-01-28

## 2025-01-25 RX ORDER — METOPROLOL SUCCINATE 25 MG
1 TABLET, EXTENDED RELEASE 24 HR ORAL
Refills: 0 | DISCHARGE

## 2025-01-25 RX ORDER — ACETAMINOPHEN 160 MG/5ML
650 SUSPENSION ORAL EVERY 6 HOURS
Refills: 0 | Status: DISCONTINUED | OUTPATIENT
Start: 2025-01-25 | End: 2025-01-28

## 2025-01-25 RX ORDER — DM/PSEUDOEPHED/ACETAMINOPHEN 10-30-250
15 CAPSULE ORAL ONCE
Refills: 0 | Status: DISCONTINUED | OUTPATIENT
Start: 2025-01-25 | End: 2025-01-28

## 2025-01-25 RX ORDER — TAMSULOSIN HYDROCHLORIDE 0.4 MG/1
1 CAPSULE ORAL
Refills: 0 | DISCHARGE

## 2025-01-25 RX ORDER — DM/PSEUDOEPHED/ACETAMINOPHEN 10-30-250
12.5 CAPSULE ORAL ONCE
Refills: 0 | Status: DISCONTINUED | OUTPATIENT
Start: 2025-01-25 | End: 2025-01-28

## 2025-01-25 RX ORDER — TAMSULOSIN HYDROCHLORIDE 0.4 MG/1
0.4 CAPSULE ORAL AT BEDTIME
Refills: 0 | Status: DISCONTINUED | OUTPATIENT
Start: 2025-01-25 | End: 2025-01-28

## 2025-01-25 RX ORDER — BUPROPION HYDROCHLORIDE 150 MG/1
300 TABLET, EXTENDED RELEASE ORAL DAILY
Refills: 0 | Status: DISCONTINUED | OUTPATIENT
Start: 2025-01-25 | End: 2025-01-28

## 2025-01-25 RX ORDER — INSULIN LISPRO 100/ML
VIAL (ML) SUBCUTANEOUS
Refills: 0 | Status: DISCONTINUED | OUTPATIENT
Start: 2025-01-25 | End: 2025-01-28

## 2025-01-25 RX ORDER — CEFTRIAXONE 250 MG/1
1000 INJECTION, POWDER, FOR SOLUTION INTRAMUSCULAR; INTRAVENOUS EVERY 24 HOURS
Refills: 0 | Status: COMPLETED | OUTPATIENT
Start: 2025-01-25 | End: 2025-01-27

## 2025-01-25 RX ORDER — VENLAFAXINE HCL 75 MG
1 TABLET ORAL
Refills: 0 | DISCHARGE

## 2025-01-25 RX ORDER — LINACLOTIDE 290 UG/1
290 CAPSULE, GELATIN COATED ORAL
Refills: 0 | Status: DISCONTINUED | OUTPATIENT
Start: 2025-01-25 | End: 2025-01-28

## 2025-01-25 RX ORDER — SIMVASTATIN 20 MG
1 TABLET ORAL
Refills: 0 | DISCHARGE

## 2025-01-25 RX ADMIN — Medication 5000 UNIT(S): at 05:31

## 2025-01-25 RX ADMIN — CEFTRIAXONE 100 MILLIGRAM(S): 250 INJECTION, POWDER, FOR SOLUTION INTRAMUSCULAR; INTRAVENOUS at 21:26

## 2025-01-25 RX ADMIN — SODIUM PHOSPHATE, DIBASIC, ANHYDROUS, POTASSIUM PHOSPHATE, MONOBASIC, AND SODIUM PHOSPHATE, MONOBASIC, MONOHYDRATE 1 PACKET(S): 852; 155; 130 TABLET, COATED ORAL at 12:11

## 2025-01-25 RX ADMIN — OXYBUTYNIN CHLORIDE 10 MILLIGRAM(S): 5 TABLET, EXTENDED RELEASE ORAL at 05:31

## 2025-01-25 RX ADMIN — CLONIDINE HYDROCHLORIDE 1 PATCH: 0.2 TABLET ORAL at 17:40

## 2025-01-25 RX ADMIN — CLONIDINE HYDROCHLORIDE 1 PATCH: 0.2 TABLET ORAL at 12:12

## 2025-01-25 RX ADMIN — Medication 5000 UNIT(S): at 21:26

## 2025-01-25 RX ADMIN — Medication 300 MILLIGRAM(S): at 12:13

## 2025-01-25 RX ADMIN — SODIUM PHOSPHATE, DIBASIC, ANHYDROUS, POTASSIUM PHOSPHATE, MONOBASIC, AND SODIUM PHOSPHATE, MONOBASIC, MONOHYDRATE 1 PACKET(S): 852; 155; 130 TABLET, COATED ORAL at 03:00

## 2025-01-25 RX ADMIN — ATORVASTATIN CALCIUM 10 MILLIGRAM(S): 80 TABLET, FILM COATED ORAL at 21:25

## 2025-01-25 RX ADMIN — Medication 5000 UNIT(S): at 13:14

## 2025-01-25 RX ADMIN — Medication 150 MILLIGRAM(S): at 12:11

## 2025-01-25 RX ADMIN — OXYBUTYNIN CHLORIDE 10 MILLIGRAM(S): 5 TABLET, EXTENDED RELEASE ORAL at 17:42

## 2025-01-25 RX ADMIN — TAMSULOSIN HYDROCHLORIDE 0.4 MILLIGRAM(S): 0.4 CAPSULE ORAL at 21:25

## 2025-01-25 RX ADMIN — BUPROPION HYDROCHLORIDE 300 MILLIGRAM(S): 150 TABLET, EXTENDED RELEASE ORAL at 12:13

## 2025-01-25 RX ADMIN — LINACLOTIDE 290 MICROGRAM(S): 290 CAPSULE, GELATIN COATED ORAL at 05:32

## 2025-01-25 RX ADMIN — Medication 5 MILLIGRAM(S): at 12:13

## 2025-01-26 LAB
-  STAPHYLOCOCCUS EPIDERMIDIS: SIGNIFICANT CHANGE UP
ANION GAP SERPL CALC-SCNC: 16 MMOL/L — SIGNIFICANT CHANGE UP (ref 5–17)
BUN SERPL-MCNC: 27 MG/DL — HIGH (ref 7–23)
CALCIUM SERPL-MCNC: 9.2 MG/DL — SIGNIFICANT CHANGE UP (ref 8.4–10.5)
CHLORIDE SERPL-SCNC: 100 MMOL/L — SIGNIFICANT CHANGE UP (ref 96–108)
CO2 SERPL-SCNC: 21 MMOL/L — LOW (ref 22–31)
CREAT SERPL-MCNC: 0.92 MG/DL — SIGNIFICANT CHANGE UP (ref 0.5–1.3)
CULTURE RESULTS: ABNORMAL
EGFR: 90 ML/MIN/1.73M2 — SIGNIFICANT CHANGE UP
GLUCOSE BLDC GLUCOMTR-MCNC: 124 MG/DL — HIGH (ref 70–99)
GLUCOSE BLDC GLUCOMTR-MCNC: 130 MG/DL — HIGH (ref 70–99)
GLUCOSE BLDC GLUCOMTR-MCNC: 137 MG/DL — HIGH (ref 70–99)
GLUCOSE BLDC GLUCOMTR-MCNC: 145 MG/DL — HIGH (ref 70–99)
GLUCOSE SERPL-MCNC: 142 MG/DL — HIGH (ref 70–99)
GRAM STN FLD: ABNORMAL
HCT VFR BLD CALC: 42.7 % — SIGNIFICANT CHANGE UP (ref 39–50)
HGB BLD-MCNC: 14.1 G/DL — SIGNIFICANT CHANGE UP (ref 13–17)
MCHC RBC-ENTMCNC: 33 G/DL — SIGNIFICANT CHANGE UP (ref 32–36)
MCHC RBC-ENTMCNC: 33.2 PG — SIGNIFICANT CHANGE UP (ref 27–34)
MCV RBC AUTO: 100.5 FL — HIGH (ref 80–100)
METHOD TYPE: SIGNIFICANT CHANGE UP
NRBC # BLD: 0 /100 WBCS — SIGNIFICANT CHANGE UP (ref 0–0)
NRBC BLD-RTO: 0 /100 WBCS — SIGNIFICANT CHANGE UP (ref 0–0)
PLATELET # BLD AUTO: 200 K/UL — SIGNIFICANT CHANGE UP (ref 150–400)
POTASSIUM SERPL-MCNC: 4 MMOL/L — SIGNIFICANT CHANGE UP (ref 3.5–5.3)
POTASSIUM SERPL-SCNC: 4 MMOL/L — SIGNIFICANT CHANGE UP (ref 3.5–5.3)
RBC # BLD: 4.25 M/UL — SIGNIFICANT CHANGE UP (ref 4.2–5.8)
RBC # FLD: 14.8 % — HIGH (ref 10.3–14.5)
SODIUM SERPL-SCNC: 137 MMOL/L — SIGNIFICANT CHANGE UP (ref 135–145)
WBC # BLD: 9.92 K/UL — SIGNIFICANT CHANGE UP (ref 3.8–10.5)
WBC # FLD AUTO: 9.92 K/UL — SIGNIFICANT CHANGE UP (ref 3.8–10.5)

## 2025-01-26 PROCEDURE — 99233 SBSQ HOSP IP/OBS HIGH 50: CPT

## 2025-01-26 PROCEDURE — 99222 1ST HOSP IP/OBS MODERATE 55: CPT | Mod: GC

## 2025-01-26 PROCEDURE — G0545: CPT

## 2025-01-26 RX ADMIN — CLONIDINE HYDROCHLORIDE 1 PATCH: 0.2 TABLET ORAL at 17:46

## 2025-01-26 RX ADMIN — Medication 5 MILLIGRAM(S): at 13:40

## 2025-01-26 RX ADMIN — Medication 150 MILLIGRAM(S): at 13:41

## 2025-01-26 RX ADMIN — Medication 300 MILLIGRAM(S): at 13:41

## 2025-01-26 RX ADMIN — CEFTRIAXONE 100 MILLIGRAM(S): 250 INJECTION, POWDER, FOR SOLUTION INTRAMUSCULAR; INTRAVENOUS at 21:33

## 2025-01-26 RX ADMIN — BUPROPION HYDROCHLORIDE 300 MILLIGRAM(S): 150 TABLET, EXTENDED RELEASE ORAL at 13:41

## 2025-01-26 RX ADMIN — Medication 5000 UNIT(S): at 06:25

## 2025-01-26 RX ADMIN — Medication 5000 UNIT(S): at 13:40

## 2025-01-26 RX ADMIN — LINACLOTIDE 290 MICROGRAM(S): 290 CAPSULE, GELATIN COATED ORAL at 06:25

## 2025-01-26 RX ADMIN — CLONIDINE HYDROCHLORIDE 1 PATCH: 0.2 TABLET ORAL at 08:46

## 2025-01-26 RX ADMIN — Medication 5000 UNIT(S): at 21:33

## 2025-01-26 RX ADMIN — TAMSULOSIN HYDROCHLORIDE 0.4 MILLIGRAM(S): 0.4 CAPSULE ORAL at 21:33

## 2025-01-26 RX ADMIN — ATORVASTATIN CALCIUM 10 MILLIGRAM(S): 80 TABLET, FILM COATED ORAL at 21:33

## 2025-01-26 RX ADMIN — OXYBUTYNIN CHLORIDE 10 MILLIGRAM(S): 5 TABLET, EXTENDED RELEASE ORAL at 06:24

## 2025-01-26 RX ADMIN — OXYBUTYNIN CHLORIDE 10 MILLIGRAM(S): 5 TABLET, EXTENDED RELEASE ORAL at 17:48

## 2025-01-26 NOTE — CONSULT NOTE ADULT - ASSESSMENT
69M PMH T2DM, HTN, HLD, MDD, previous UTIs, RAMANDEEP (not using device) p/w urinary frequency and malodorous urine and AMS. Family called EMS mostly because he had difficulty navigating bathroom. He had one episode of fever 100.6 in the ER, other wise afebrile. Initial labs with leucocytosis of 24  and positive UA. Was started on ceftriaxone for suspected UTI. Now both urine and blood cultures positive for staph epidermidis ID asked to evaluate.      UA mod LE, 220 WBC, many bacteria  Urine cx 10-49 Staph epidermidis  Blood cx 1/4 staph epidermidis    Blood cx 1/26 collected    CXR: Left chest wall deformity. Right basilar atelectasis. No focal consolidation or significant pleural effusion is seen.      # Complicated UTI  # Positive blood cultures    - Concern for CoNS bacteremia and UTI, although a unlikely pathogen  - Would start vancomycin 1 gm Q12h  - Check TTE  - follow up repeat blood cultures  - consider CT abdomen with contrast          All recommendations are tentative pending Attending Attestation.    Christos Nj MD, PGY-5  ID Fellow  [employer] Teams Preferred  After 5pm/weekends call [phone]   69M PMH T2DM, HTN, HLD, MDD, previous UTIs, RAMANDEEP (not using device) p/w urinary frequency and malodorous urine and AMS. Family called EMS mostly because he had difficulty navigating bathroom. He had one episode of fever 100.6 in the ER, other wise afebrile. Initial labs with leucocytosis of 24  and positive UA. Was started on ceftriaxone for suspected UTI. Now both urine and blood cultures positive for staph epidermidis ID asked to evaluate.      UA mod LE, 220 WBC, many bacteria  Urine cx 10-49 Staph epidermidis  Blood cx 1/4 staph epidermidis    Blood cx 1/26 collected    CXR: Left chest wall deformity. Right basilar atelectasis. No focal consolidation or significant pleural effusion is seen.      # suspected UTI  # Positive blood cultures    - Concern for CoNS bacteremia and UTI, although unlikely pathogen  - Patient does not have any hardware in situ  - Continue ceftriaxone, clinically responded, sensitivities requested  - If persistent positive cultures, will need TTE   - follow up repeat blood cultures    Case d/w Attending and Primary team.     Christos Nj MD, PGY-5  ID Fellow  [employer] Teams Preferred  After 5pm/weekends call [phone]

## 2025-01-26 NOTE — CONSULT NOTE ADULT - SUBJECTIVE AND OBJECTIVE BOX
Patient is a 69y old  Male who presents with a chief complaint of UTI (26 Jan 2025 12:35)    HPI:  69M PMH T2DM, HTN, HLD, MDD, previous UTIs, RAMANDEEP (not using device) p/w urinary frequency and malodorous urine and AMS. Family called EMS mostly because he had difficulty navigating bathroom. He had one episode of fever 100.6 in the ER, other wise afebrile. Initial labs with leucocytosis of 24  and positive UA. Was started on ceftriaxone for suspected UTI. Now both urine and blood cultures positive for staph epidermidis ID asked to evaluate.      UA mod LE, 220 WBC, many bacteria  Urine cx 10-49 Staph epidermidis  Blood cx 1/4 staph epidermidis    CXR: Left chest wall deformity. Right basilar atelectasis. No focal consolidation or significant pleural effusion is seen.      prior hospital charts reviewed [  ]  primary team notes reviewed [x  ]  other consultant notes reviewed [ x ]    PAST MEDICAL & SURGICAL HISTORY:  Diabetes      Hypertension      HLD (hyperlipidemia)      MDD (major depressive disorder)      Gastric ulcer      S/P cholecystectomy          Allergies  No Known Allergies    ANTIMICROBIALS (past 90 days)  MEDICATIONS  (STANDING):  cefTRIAXone   IVPB   100 mL/Hr IV Intermittent (01-24-25 @ 12:22)    cefTRIAXone   IVPB   100 mL/Hr IV Intermittent (01-25-25 @ 21:26)        cefTRIAXone   IVPB 1000 every 24 hours    MEDICATIONS  (STANDING):  acetaminophen     Tablet .. 650 every 6 hours PRN  allopurinol 300 daily  atorvastatin 10 at bedtime  buPROPion XL (24-Hour) . 300 daily  cloNIDine Patch 0.1 mG/24Hr(s) 1 every 7 days  dextrose 50% Injectable 25 once  dextrose 50% Injectable 12.5 once  dextrose 50% Injectable 25 once  dextrose Oral Gel 15 once PRN  finasteride 5 daily  glucagon  Injectable 1 once  heparin   Injectable 5000 every 8 hours  insulin lispro (ADMELOG) corrective regimen sliding scale  three times a day before meals  insulin lispro (ADMELOG) corrective regimen sliding scale  at bedtime  linaclotide 290 before breakfast  oxybutynin 10 two times a day  tamsulosin 0.4 at bedtime  venlafaxine XR. 150 daily    SOCIAL HISTORY: Lives with wife at home, no tobacco,  etoh, IVDU    FAMILY HISTORY:  No pertinent family history in first degree relatives      REVIEW OF SYSTEMS  [  ] ROS unobtainable because:    [ x ] All other systems negative except as noted below:	    Constitutional:  [ ] fever [ ] chills  [ ] weight loss  [ ] weakness  Skin:  [ ] rash [ ] phlebitis	  Eyes: [ ] icterus [ ] pain  [ ] discharge	  ENMT: [ ] sore throat  [ ] thrush [ ] ulcers [ ] exudates  Respiratory: [ ] dyspnea [ ] hemoptysis [ ] cough [ ] sputum	  Cardiovascular:  [ ] chest pain [ ] palpitations [ ] edema	  Gastrointestinal:  [ ] nausea [ ] vomiting [ ] diarrhea [ ] constipation [ ] pain	  Genitourinary:  [x ] dysuria [x ] frequency [ ] hematuria [ ] discharge [ ] flank pain  [ ] incontinence  Musculoskeletal:  [ ] myalgias [ ] arthralgias [ ] arthritis  [ ] back pain  Neurological:  [ ] headache [ ] seizures  [ x] confusion/altered mental status  Psychiatric:  [ ] anxiety [ ] depression	  Hematology/Lymphatics:  [ ] lymphadenopathy  Endocrine:  [ ] adrenal [ ] thyroid  Allergic/Immunologic:	 [ ] transplant [ ] seasonal    Vital Signs Last 24 Hrs  T(F): 98.1 (01-26-25 @ 11:58), Max: 100.6 (01-24-25 @ 11:55)  Vital Signs Last 24 Hrs  HR: 74 (01-26-25 @ 11:58) (74 - 82)  BP: 124/76 (01-26-25 @ 11:58) (118/73 - 132/82)  RR: 18 (01-26-25 @ 11:58)  SpO2: 92% (01-26-25 @ 11:58) (92% - 98%)  Wt(kg): --    PHYSICAL EXAM:  Constitutional: non-toxic, no distress  HEAD/EYES: anicteric, no conjunctival injection  ENT:  supple, no thrush  Cardiovascular:   normal S1, S2, no murmur, no edema  Respiratory:  clear BS bilaterally, no wheezes, no rales  GI:  soft, non-tender, normal bowel sounds  :  no knutson, no CVA tenderness  Musculoskeletal:  no synovitis, normal ROM  Neurologic: awake and alert, normal strength, no focal findings  Skin:  no rash, no erythema, no phlebitis  Heme/Onc: no lymphadenopathy   Psychiatric:  awake, alert, appropriate mood                            14.1   9.92  )-----------( 200      ( 26 Jan 2025 07:09 )             42.7   01-26    137  |  100  |  27[H]  ----------------------------<  142[H]  4.0   |  21[L]  |  0.92    Ca    9.2      26 Jan 2025 07:10  Phos  2.3     01-25  Mg     1.8     01-25    TPro  6.3  /  Alb  3.6  /  TBili  0.5  /  DBili  x   /  AST  14  /  ALT  19  /  AlkPhos  41  01-24      MICROBIOLOGY:  Culture - Urine (collected 24 Jan 2025 14:31)  Source: Clean Catch Clean Catch (Midstream)  Final Report (25 Jan 2025 22:31):    10,000 - 49,000 CFU/mL Staphylococcus epidermidis "Susceptibilities not    performed"    Culture - Blood (collected 24 Jan 2025 13:20)  Source: .Blood BLOOD  Preliminary Report (25 Jan 2025 18:02):    No growth at 24 hours    Culture - Blood (collected 24 Jan 2025 13:19)  Source: .Blood BLOOD  Gram Stain (26 Jan 2025 04:23):    Growth in anaerobic bottle: Gram Positive Cocci in Clusters  Preliminary Report (26 Jan 2025 04:23):    Growth in anaerobic bottle: Gram Positive Cocci in Clusters    Direct identification is available within approximately 3-5    hours either by Blood Panel Multiplexed PCR or Direct    MALDI-TOF. Details: [email or web address]  Organism: Blood Culture PCR (26 Jan 2025 06:02)  Organism: Blood Culture PCR (26 Jan 2025 06:02)      Method Type: PCR      -  Staphylococcus epidermidis: Detec                  RADIOLOGY:  imaging below personally reviewed and agree with findings    < from: POCUS ED Kidney and Bladder (01.24.25 @ 20:13) >  PROCEDURE DATE:  01/24/2025    FOCUSED ED ULTRASOUND REPORT          INTERPRETATION:  Interpretation:  Focused grayscale imaging of the kidneys and bladder was performed. The   kidneys and bladder were visualized and scanned in both transverse and   longitudinal planes.    The right kidney no hydronephrosis present.  The left kidney no hydronephrosis present.  Renal cyst present    IMPRESSION:  No hydronephrosis present in the left and right kidneys.           Patient is a 69y old  Male who presents with a chief complaint of UTI (26 Jan 2025 12:35)    HPI:  69M PMH T2DM, HTN, HLD, MDD, previous UTIs, RAMANDEEP (not using device) p/w urinary frequency and malodorous urine and AMS. Family called EMS mostly because he had difficulty navigating bathroom. He had one episode of fever 100.6 in the ER, other wise afebrile. Initial labs with leucocytosis of 24  and positive UA. Was started on ceftriaxone for suspected UTI. Now both urine and blood cultures positive for staph epidermidis ID asked to evaluate.      UA mod LE, 220 WBC, many bacteria  Urine cx 10-49 Staph epidermidis  Blood cx 1/4 staph epidermidis    CXR: Left chest wall deformity. Right basilar atelectasis. No focal consolidation or significant pleural effusion is seen.    prior hospital charts reviewed [  ]  primary team notes reviewed [x  ]  other consultant notes reviewed [ x ]    PAST MEDICAL & SURGICAL HISTORY:  Diabetes  Hypertension  HLD (hyperlipidemia)  MDD (major depressive disorder)  Gastric ulcer  S/P cholecystectomy    Allergies  No Known Allergies    ANTIMICROBIALS:  cefTRIAXone   IVPB 1000 every 24 hours (1/24-)    MEDICATIONS  (STANDING):  allopurinol 300 daily  atorvastatin 10 at bedtime  buPROPion XL (24-Hour) . 300 daily  cloNIDine Patch 0.1 mG/24Hr(s) 1 every 7 days  finasteride 5 daily  heparin   Injectable 5000 every 8 hours  insulin lispro (ADMELOG) corrective regimen sliding scale  three times a day before meals  insulin lispro (ADMELOG) corrective regimen sliding scale  at bedtime  linaclotide 290 before breakfast  oxybutynin 10 two times a day  tamsulosin 0.4 at bedtime  venlafaxine XR. 150 daily    SOCIAL HISTORY: Lives with wife at home, no tobacco,  etoh, IVDU    FAMILY HISTORY:  N/C    REVIEW OF SYSTEMS  [  ] ROS unobtainable because:    [ x ] All other systems negative except as noted below:	    Constitutional:  [ ] fever [ ] chills  [ ] weight loss  [ ] weakness  Skin:  [ ] rash [ ] phlebitis	  Eyes: [ ] icterus [ ] pain  [ ] discharge	  ENMT: [ ] sore throat  [ ] thrush [ ] ulcers [ ] exudates  Respiratory: [ ] dyspnea [ ] hemoptysis [ ] cough [ ] sputum	  Cardiovascular:  [ ] chest pain [ ] palpitations [ ] edema	  Gastrointestinal:  [ ] nausea [ ] vomiting [ ] diarrhea [ ] constipation [ ] pain	  Genitourinary:  [x ] dysuria [x ] frequency [ ] hematuria [ ] discharge [ ] flank pain  [ ] incontinence  Musculoskeletal:  [ ] myalgias [ ] arthralgias [ ] arthritis  [ ] back pain  Neurological:  [ ] headache [ ] seizures  [ x] confusion/altered mental status  Psychiatric:  [ ] anxiety [ ] depression	  Hematology/Lymphatics:  [ ] lymphadenopathy  Endocrine:  [ ] adrenal [ ] thyroid  Allergic/Immunologic:	 [ ] transplant [ ] seasonal    Vital Signs Last 24 Hrs  T(F): 98.1 (01-26-25 @ 11:58), Max: 100.6 (01-24-25 @ 11:55)  Vital Signs Last 24 Hrs  HR: 74 (01-26-25 @ 11:58) (74 - 82)  BP: 124/76 (01-26-25 @ 11:58) (118/73 - 132/82)  RR: 18 (01-26-25 @ 11:58)  SpO2: 92% (01-26-25 @ 11:58) (92% - 98%)  Wt(kg): --    PHYSICAL EXAM:  Constitutional: non-toxic, no distress  HEAD/EYES: anicteric  ENT:  supple  Cardiovascular:   normal S1, S2, no murmur, no edema  Respiratory:  clear BS bilaterally, no wheezes, no rales  GI:  soft, non-tender, normal bowel sounds  :  no knutson  Musculoskeletal:  no synovitis, normal ROM  Neurologic: awake and alert, normal strength, no focal findings  Skin:  no rash  Psychiatric:  awake, alert, appropriate mood                        14.1   9.92  )-----------( 200      ( 26 Jan 2025 07:09 )             42.7     137  |  100  |  27[H]  ----------------------------<  142[H]  4.0   |  21[L]  |  0.92    Ca    9.2      26 Jan 2025 07:10  Phos  2.3     01-25  Mg     1.8     01-25    TPro  6.3  /  Alb  3.6  /  TBili  0.5  /  DBili  x   /  AST  14  /  ALT  19  /  AlkPhos  41  01-24    WBC Count: 9.92 (01-26-25 @ 07:09)  WBC Count: 15.72 (01-25-25 @ 01:36)  WBC Count: 24.63 (01-24-25 @ 11:41)    Creatinine: 0.92 (01-26)  Creatinine: 1.24 (01-25)  Creatinine: 1.17 (01-24)  Creatinine: 1.29 (01-24)    Urinalysis + Microscopic Examination (01.24.25 @ 14:31)   pH Urine: 5.5  Urine Appearance: Cloudy  Color: Yellow  Specific Gravity: >1.030  Protein, Urine: 30 mg/dL  Glucose Qualitative, Urine: >=1000 mg/dL  Ketone - Urine: 15 mg/dL  Blood, Urine: Small  Bilirubin: Negative  Urobilinogen: 0.2 mg/dL  Leukocyte Esterase Concentration: Moderate  Nitrite: Negative  Review: Reviewed  White Blood Cell - Urine: 220 /HPF  Red Blood Cell - Urine: 4 /HPF  Bacteria: Many /HPF  Cast: 0 /LPF  Epithelial Cells: 5 /HPF    MICROBIOLOGY:  Culture - Urine (collected 24 Jan 2025 14:31)  Source: Clean Catch Clean Catch (Midstream)  Final Report (25 Jan 2025 22:31):    10,000 - 49,000 CFU/mL Staphylococcus epidermidis "Susceptibilities not performed"    Culture - Blood (collected 24 Jan 2025 13:20)  Source: .Blood BLOOD  Preliminary Report (25 Jan 2025 18:02):    No growth at 24 hours    Culture - Blood (collected 24 Jan 2025 13:19)  Source: .Blood BLOOD  Gram Stain (26 Jan 2025 04:23):    Growth in anaerobic bottle: Gram Positive Cocci in Clusters  Preliminary Report (26 Jan 2025 04:23):    Growth in anaerobic bottle: Gram Positive Cocci in Clusters    Direct identification is available within approximately 3-5    hours either by Blood Panel Multiplexed PCR or Direct    MALDI-TOF. Details: [email or web address]  Organism: Blood Culture PCR (26 Jan 2025 06:02)  Organism: Blood Culture PCR (26 Jan 2025 06:02)      Method Type: PCR      -  Staphylococcus epidermidis: Detec    RADIOLOGY:  imaging below personally reviewed and agree with findings    POCUS ED Kidney and Bladder (01.24.25 @ 20:13) >  IMPRESSION:  No hydronephrosis present in the left and right kidneys.    Xray Chest 1 View-PORTABLE IMMEDIATE (01.24.25 @ 12:44) >  IMPRESSION: Left chest wall deformity. Right basilar atelectasis. No focal consolidation or significant pleural effusion is seen.    MR Hip No Cont, Left (01.02.24 @ 21:36) >  IMPRESSION:  Mild proximal hamstring tendinosis at the conjoined tendon origin. No tear or retraction.  Mild left hip degenerative change.

## 2025-01-26 NOTE — CHART NOTE - NSCHARTNOTEFT_GEN_A_CORE
called by RN with blood culture results  Blood culture 1/23 1/4 growth in anaerobic bottle: gram positive cocci in clusters staph epidermis, could be contamination.   Will repeat blood culture X 2  continue ceftriaxone for UTI  F/U with primary team in     [place]  [id number]

## 2025-01-26 NOTE — CONSULT NOTE ADULT - ATTENDING COMMENTS
69M with T2DM, HTN, HLD, MDD, previous UTIs, RAMANDEEP (not using device) admitted 1/24/2025 c/o urinary frequency and malodorous urine and AMS. BIBA.  Tm 100.6.  WBC 24K. Ua with 220 WBC.  BC and UC sent.  Started empirically on ceftriaxone.  Sonogram (-) hydronephrosis.  Fever resolved.  WBC now 9K.  BC with Staph epi initially felt to be procurement contaminant, however, UC also (+) staph epi.  ID asked to help management.    Sepsis / UTI due to Staph epi 69M with T2DM, HTN, HLD, MDD, previous UTIs, RAMANDEEP (not using device) admitted 1/24/2025 c/o urinary frequency and malodorous urine and AMS. BIBA.  Tm 100.6.  WBC 24K. Ua with 220 WBC.  BC and UC sent.  Started empirically on ceftriaxone.  Sonogram (-) hydronephrosis.  Fever resolved.  WBC now 9K.  BC with Staph epi initially felt to be procurement contaminant, however, UC also (+) staph epi.  ID asked to help management.    Sepsis / UTI due to Staph epidermidis  - responding to ceftriaxone with resolution of leukocytosis and now afebrile  - if repeat BC also positive for staph epi, would pursue echo  - ceftriaxone for now  - requested sensitivities from microbiology lab, will f/u results    Please call the ID service [phone] with questions or concerns over the weekend

## 2025-01-26 NOTE — CONSULT NOTE ADULT - ATTENDING SUPERVISION STATEMENT
[No studies available for review at this time.] : No studies available for review at this time. Fellow

## 2025-01-26 NOTE — PROGRESS NOTE ADULT - NSPROGADDITIONALINFOA_GEN_ALL_CORE
time spent reviewing prior charts, meds, discussing plan with patient= 50 min     d/w medicine ACP Sophie
time spent reviewing prior charts, meds, discussing plan with patient= 60 min     d/w Medicine ACP Zeta

## 2025-01-27 ENCOUNTER — RESULT REVIEW (OUTPATIENT)
Age: 70
End: 2025-01-27

## 2025-01-27 LAB
-  GENTAMICIN: SIGNIFICANT CHANGE UP
-  NITROFURANTOIN: SIGNIFICANT CHANGE UP
-  OXACILLIN: SIGNIFICANT CHANGE UP
-  PENICILLIN: SIGNIFICANT CHANGE UP
-  RIFAMPIN: SIGNIFICANT CHANGE UP
-  TETRACYCLINE: SIGNIFICANT CHANGE UP
-  TRIMETHOPRIM/SULFAMETHOXAZOLE: SIGNIFICANT CHANGE UP
-  VANCOMYCIN: SIGNIFICANT CHANGE UP
ANION GAP SERPL CALC-SCNC: 15 MMOL/L — SIGNIFICANT CHANGE UP (ref 5–17)
BUN SERPL-MCNC: 22 MG/DL — SIGNIFICANT CHANGE UP (ref 7–23)
CALCIUM SERPL-MCNC: 9.5 MG/DL — SIGNIFICANT CHANGE UP (ref 8.4–10.5)
CHLORIDE SERPL-SCNC: 102 MMOL/L — SIGNIFICANT CHANGE UP (ref 96–108)
CO2 SERPL-SCNC: 24 MMOL/L — SIGNIFICANT CHANGE UP (ref 22–31)
CREAT SERPL-MCNC: 0.93 MG/DL — SIGNIFICANT CHANGE UP (ref 0.5–1.3)
CULTURE RESULTS: ABNORMAL
CULTURE RESULTS: ABNORMAL
EGFR: 89 ML/MIN/1.73M2 — SIGNIFICANT CHANGE UP
GLUCOSE BLDC GLUCOMTR-MCNC: 107 MG/DL — HIGH (ref 70–99)
GLUCOSE BLDC GLUCOMTR-MCNC: 117 MG/DL — HIGH (ref 70–99)
GLUCOSE BLDC GLUCOMTR-MCNC: 124 MG/DL — HIGH (ref 70–99)
GLUCOSE SERPL-MCNC: 124 MG/DL — HIGH (ref 70–99)
HCT VFR BLD CALC: 39.1 % — SIGNIFICANT CHANGE UP (ref 39–50)
HGB BLD-MCNC: 13 G/DL — SIGNIFICANT CHANGE UP (ref 13–17)
MAGNESIUM SERPL-MCNC: 1.8 MG/DL — SIGNIFICANT CHANGE UP (ref 1.6–2.6)
MCHC RBC-ENTMCNC: 32.7 PG — SIGNIFICANT CHANGE UP (ref 27–34)
MCHC RBC-ENTMCNC: 33.2 G/DL — SIGNIFICANT CHANGE UP (ref 32–36)
MCV RBC AUTO: 98.2 FL — SIGNIFICANT CHANGE UP (ref 80–100)
METHOD TYPE: SIGNIFICANT CHANGE UP
NRBC # BLD: 0 /100 WBCS — SIGNIFICANT CHANGE UP (ref 0–0)
NRBC BLD-RTO: 0 /100 WBCS — SIGNIFICANT CHANGE UP (ref 0–0)
ORGANISM # SPEC MICROSCOPIC CNT: ABNORMAL
PHOSPHATE SERPL-MCNC: 3.1 MG/DL — SIGNIFICANT CHANGE UP (ref 2.5–4.5)
PLATELET # BLD AUTO: 219 K/UL — SIGNIFICANT CHANGE UP (ref 150–400)
POTASSIUM SERPL-MCNC: 3.9 MMOL/L — SIGNIFICANT CHANGE UP (ref 3.5–5.3)
POTASSIUM SERPL-SCNC: 3.9 MMOL/L — SIGNIFICANT CHANGE UP (ref 3.5–5.3)
RBC # BLD: 3.98 M/UL — LOW (ref 4.2–5.8)
RBC # FLD: 14.6 % — HIGH (ref 10.3–14.5)
SODIUM SERPL-SCNC: 141 MMOL/L — SIGNIFICANT CHANGE UP (ref 135–145)
SPECIMEN SOURCE: SIGNIFICANT CHANGE UP
WBC # BLD: 8.86 K/UL — SIGNIFICANT CHANGE UP (ref 3.8–10.5)
WBC # FLD AUTO: 8.86 K/UL — SIGNIFICANT CHANGE UP (ref 3.8–10.5)

## 2025-01-27 PROCEDURE — 99232 SBSQ HOSP IP/OBS MODERATE 35: CPT

## 2025-01-27 PROCEDURE — 93306 TTE W/DOPPLER COMPLETE: CPT | Mod: 26

## 2025-01-27 PROCEDURE — G0545: CPT

## 2025-01-27 RX ADMIN — Medication 5 MILLIGRAM(S): at 12:46

## 2025-01-27 RX ADMIN — CLONIDINE HYDROCHLORIDE 1 PATCH: 0.2 TABLET ORAL at 20:00

## 2025-01-27 RX ADMIN — CEFTRIAXONE 100 MILLIGRAM(S): 250 INJECTION, POWDER, FOR SOLUTION INTRAMUSCULAR; INTRAVENOUS at 22:05

## 2025-01-27 RX ADMIN — TAMSULOSIN HYDROCHLORIDE 0.4 MILLIGRAM(S): 0.4 CAPSULE ORAL at 22:06

## 2025-01-27 RX ADMIN — OXYBUTYNIN CHLORIDE 10 MILLIGRAM(S): 5 TABLET, EXTENDED RELEASE ORAL at 05:46

## 2025-01-27 RX ADMIN — Medication 300 MILLIGRAM(S): at 12:45

## 2025-01-27 RX ADMIN — ATORVASTATIN CALCIUM 10 MILLIGRAM(S): 80 TABLET, FILM COATED ORAL at 22:05

## 2025-01-27 RX ADMIN — BUPROPION HYDROCHLORIDE 300 MILLIGRAM(S): 150 TABLET, EXTENDED RELEASE ORAL at 16:14

## 2025-01-27 RX ADMIN — OXYBUTYNIN CHLORIDE 10 MILLIGRAM(S): 5 TABLET, EXTENDED RELEASE ORAL at 17:38

## 2025-01-27 RX ADMIN — LINACLOTIDE 290 MICROGRAM(S): 290 CAPSULE, GELATIN COATED ORAL at 05:46

## 2025-01-27 RX ADMIN — Medication 150 MILLIGRAM(S): at 12:45

## 2025-01-27 RX ADMIN — Medication 5000 UNIT(S): at 05:46

## 2025-01-27 RX ADMIN — CLONIDINE HYDROCHLORIDE 1 PATCH: 0.2 TABLET ORAL at 07:34

## 2025-01-27 NOTE — PHYSICAL THERAPY INITIAL EVALUATION ADULT - ACTIVE RANGE OF MOTION EXAMINATION, REHAB EVAL
dayana. upper extremity Active ROM was WNL (within normal limits)/bilateral lower extremity Active ROM was WNL (within normal limits)

## 2025-01-27 NOTE — PROGRESS NOTE ADULT - PROBLEM SELECTOR PLAN 7
- c/w clonidine patch to avoid rebound hypertension, otherwise will hold CCB/ACEi, thiazide diuretic, BB : given sepsis and re-add as appopriate in tomorrow 1/27 monitor BP
- c/w clonidine patch to avoid rebound hypertension, otherwise will hold CCB/ACEi, thiazide diuretic, BB ON given sepsis and re-add as appopriate in AM, monitor BP
- c/w clonidine patch to avoid rebound hypertension, otherwise will hold CCB/ACEi, thiazide diuretic, BB : given sepsis and re-add as appopriate in tomorrow 1/27 monitor BP

## 2025-01-27 NOTE — PHYSICAL THERAPY INITIAL EVALUATION ADULT - PERTINENT HX OF CURRENT PROBLEM, REHAB EVAL
Pt is a 68 y/o male with pmhx of DM, HTN presents to the ED BIBA for urinary frequency and urgency x few days.   CXR:  Left chest wall deformity. Right basilar atelectasis. No focal consolidation or significant pleural effusion is seen.  POCUS ED Kidney/Bladder: No hydronephrosis present in the left and right kidneys.

## 2025-01-27 NOTE — PROGRESS NOTE ADULT - PROBLEM SELECTOR PLAN 6
- ZIYAD, monitor FS  - consider d/w PCP reconsideration of home SGLT2i use given recurrent UTI hx

## 2025-01-27 NOTE — PHYSICAL THERAPY INITIAL EVALUATION ADULT - PLANNED THERAPY INTERVENTIONS, PT EVAL
Stairs: GOAL: Pt will ascend/descend 10 stairs with CGA in order to return home safety in 2 weeks./balance training/bed mobility training/gait training/strengthening

## 2025-01-27 NOTE — PROGRESS NOTE ADULT - PROBLEM SELECTOR PLAN 11
- f/u screening EKG  - repeat PT/INR in AM   - HSQ VTE PPx  - fall, aspiration precautions   - CC/DASH/TLC diet pending RN dysphagia screen  - disposition pending course  - does not report BPH however on prostate medications, will c/w finasteride and flomax however holding doxazosin ON until confirm w/ PCP in AM that he is on both tamsulosin and doxazosin, ctm BS per routine
- HSQ VTE PPx  - fall, aspiration precautions   - disposition pending course: PT eval pending   - does not report BPH however on prostate medications, will c/w finasteride and flomax however holding doxazosin ON until confirm w/ PCP in AM that he is on both tamsulosin and doxazosin, ctm BS per routine
- HSQ VTE PPx  - fall, aspiration precautions   - disposition pending course: PT eval pending   - does not report BPH however on prostate medications, will c/w finasteride and flomax however holding doxazosin ON until confirm w/ PCP in AM that he is on both tamsulosin and doxazosin, ctm BS per routine

## 2025-01-27 NOTE — PROVIDER CONTACT NOTE (MEDICATION) - ACTION/TREATMENT ORDERED:
Provider made aware. Patient educated on indication of medication. Patient verbalized understanding. Patient refused.

## 2025-01-27 NOTE — PROGRESS NOTE ADULT - PROBLEM SELECTOR PLAN 2
CXR w/o consolidation, denies cough/dyspnea  Spo2% documented 89, placed on 2-3L NC  breathing comfortably on exam, speaking in full sentences  reports hx RAMANDEEP not using device  wife reports patient w/ baseline low SpO2%   DDimer not elevated  - wean O2 as tolerates ON  - repeat troponin to trend   - proBNP elevated however exam euvolemic, unable to contact PCP over the weekend ECHO ordered
CXR w/o consolidation, denies cough/dyspnea, Spo2% documented 89, placed on 2-3L NC  breathing comfortably on exam, speaking in full sentences  - satting well on RA  - check ambulation Spo2  - repeat troponin to trend   - proBNP elevated however exam euvolemic, unable to contact PCP over the weekend ECHO ordered per above normal LV systolic function, no significant valvular disease
CXR w/o consolidation, denies cough/dyspnea, Spo2% documented 89, placed on 2-3L NC  breathing comfortably on exam, speaking in full sentences  - satting well on RA  - check ambulation Spo2  - repeat troponin to trend   - proBNP elevated however exam euvolemic, unable to contact PCP over the weekend ECHO ordered

## 2025-01-27 NOTE — PROGRESS NOTE ADULT - PROBLEM SELECTOR PROBLEM 9
RAMANDEEP (obstructive sleep apnea)

## 2025-01-27 NOTE — PHYSICAL THERAPY INITIAL EVALUATION ADULT - ADDITIONAL COMMENTS
Pt resides in a condo with wife and son. Pt has 14 stairs to enter with HR. Pt ambulates with a straight cane at baseline and independent with all ADLs. Pt owns rolling walker, rollator and shower chair.

## 2025-01-27 NOTE — PHYSICAL THERAPY INITIAL EVALUATION ADULT - LEVEL OF INDEPENDENCE: GAIT, REHAB EVAL
General Cardiology   Progress Note -  Team One   Linda Logan 68 y o  male MRN: [id number]    Unit/Bed#: 334-65 Encounter: [id number]    Assessment:    1  Chest pain  -patient presents 3-8-2023 with an episode of chest burning which was mildly improved with sublingual nitroglycerin on admission but did not resolve until early yesterday morning  -he has been chest pain free since   -hs troponin levels minimally elevated and flat, levels were also similar when checked last week  -No ischemic changes on EKG as well; repeat EKG this AM performed in the setting of orthostatic hypotension also with no ischemic changes  -limited echo from yesterday shows EF 65% with no WMA  -at this time he does not require repeat ischemic evaluation  -symptoms may be GI related vs  In the setting of volume overload - see below  -continue Ranexa 500 mg BID     2  Known coronary artery disease  -with prior OM/RCA stenting, most recently had a SAM to the proximal LAD and PCI to the mid LAD in December 2022  -was transitioned from aspirin and Brilinta to Plavix monotherapy at hospital discharge earlier in the day on 3-8-2023 in the setting of GI bleed; per Dr Sergey Christianson, continue monotherapy with Plavix only for the next 7-10 days and then add aspirin back to regimen   continue statin  -propranolol on hold in setting of hypotension  -No ischemic changes on EKG on admit; repeat EKG this AM performed in the setting of orthostatic hypotension also with no ischemic changes    3   Iatrogenic volume overload  -patient noted to have bilateral lower extremity and bilateral hand edema  - likely iatrogenic secondary to recent GI bleed requiring 4 units of PRBC's as well as IV fluid resuscitation in the setting of hypotension  - lasix 40 mg IV x 1 dose given yesterday with reduction in weight to 218 lbs this AM down form 224 lbs yesterday; UO 5 5 L; no PO intake, cannot calculate net negative status  -echo from yesterday as above  -he is profoundly orthostatic today and SLIM has ordered IVF-hold further diuretics  -he continues with trace B/L LE edema; B/L pedal edema and B/L hand edema this AM but it is improved from yesterday                4  Recent acute GI bleeding s/p placement of two hemostatic clips in the descending colon  -noted  -no evidence of active bleeding   - hemoglobin 8 0 this AM down form 8 5 yesterday AM  -continue to trend CBC  -was transitioned from aspirin and Brilinta to Plavix monotherapy at hospital discharge earlier in the day on 3-8-2023 in the setting of GI bleed; per Dr Willian Bamberger, continue monotherapy with Plavix only for the next 7-10 days and then add aspirin back to regimen     5  Aortic stenosis  -stable in the moderate range per echo 1 year ago    6  Hypertension  - he is profoundly orthostatic this AM; continue to hold propanolol and IVF ordered by primary team       Plan/Recommendations:  -he is profoundly orthostatic this AM; continue to hold propanolol and IVF ordered by primary team  -was transitioned from aspirin and Brilinta to Plavix monotherapy at hospital discharge earlier in the day on 3-8-2023 in the setting of GI bleed; per Dr Willian Bamberger, continue monotherapy with Plavix only for the next 7-10 days and then add aspirin back to regimen  -repeat Hgb this afternoon given 0 5 gm drop this AM      _______________________________________________________________________    Subjective  Patient notes lightheadedness with position change this morning during orthostatic vitals; chest discomfort; his bilateral lower and upper extremity edema have improved since yesterday      Review of Systems   Constitutional: Positive for malaise/fatigue  Negative for chills and fever  HENT: Negative for congestion  Cardiovascular: Negative for chest pain, dyspnea on exertion, leg swelling, orthopnea and palpitations  Respiratory: Negative for cough, shortness of breath (no SOB at rest) and wheezing  Endocrine: Negative  Hematologic/Lymphatic: Negative  Skin: Negative  Musculoskeletal: Negative  Gastrointestinal: Negative for bloating, abdominal pain, nausea and vomiting  Genitourinary: Negative  Neurological: Positive for light-headedness (with position change)  Negative for dizziness  Psychiatric/Behavioral: Negative  All other systems reviewed and are negative  Objective:   Vitals: Blood pressure 149/80, pulse 91, temperature 98 1 °F (36 7 °C), temperature source Oral, resp  rate 20, height 5' 9" (1 753 m), weight 99 1 kg (218 lb 7 6 oz), SpO2 96 %  ,     Wt Readings from Last 3 Encounters:   03/10/23 99 1 kg (218 lb 7 6 oz)   03/08/23 105 kg (231 lb 7 7 oz)   03/02/23 98 9 kg (218 lb 0 6 oz)        Lab Results   Component Value Date    CREATININE 1 00 03/10/2023    CREATININE 1 00 03/09/2023    CREATININE 1 14 03/08/2023         Body mass index is 32 26 kg/m²  ,     Systolic (10IYE), GKM:873 , Min:108 , DLH:597     Diastolic (41UFN), RZH:37, Min:59, Max:80          Intake/Output Summary (Last 24 hours) at 3/10/2023 0751  Last data filed at 3/9/2023 2243  Gross per 24 hour   Intake 1353 33 ml   Output 5550 ml   Net -4196 67 ml     Weight (last 2 days)     Date/Time Weight    03/10/23 0600 99 1 (218 48)    03/09/23 10:29:34 102 (224)    03/09/23 0948 102 (224)    03/09/23 0553 102 (224 87)    03/09/23 0354 102 (224 65)    03/09/23 03:14:37 102 (224 65)    03/08/23 2306 105 (231 48)            Telemetry Review: Not on tele        Physical Exam  Vitals reviewed  Constitutional:       General: He is not in acute distress  HENT:      Head: Normocephalic and atraumatic  Mouth/Throat:      Mouth: Mucous membranes are moist    Cardiovascular:      Rate and Rhythm: Normal rate and regular rhythm  Heart sounds: Normal heart sounds, S1 normal and S2 normal  No murmur heard       Comments: Trace B/L LE edema  B/L pedal edema noted  B/L UE/hand edema noted  Pulmonary:      Effort: Pulmonary effort is normal  No respiratory distress  Breath sounds: Normal breath sounds  Abdominal:      General: Bowel sounds are normal  There is no distension  Palpations: Abdomen is soft  Musculoskeletal:         General: Normal range of motion  Cervical back: Normal range of motion and neck supple  Right lower leg: Edema present  Left lower leg: Edema present  Skin:     General: Skin is warm and dry  Neurological:      Mental Status: He is alert and oriented to person, place, and time  Psychiatric:         Mood and Affect: Mood normal          LABORATORY RESULTS  Results from last 7 days   Lab Units 03/10/23  0459   CK TOTAL U/L 27*     CBC with diff:   Results from last 7 days   Lab Units 03/10/23  0459 03/09/23  0503 03/08/23  2346 03/08/23  0452 03/07/23  1429 03/06/23  0432 03/05/23  1421 03/05/23  0431 03/04/23  1223 03/04/23  0442   WBC Thousand/uL 5 40 6 40 6 96 5 58 6 27 6 08  --  6 84   < > 6 57   HEMOGLOBIN g/dL 8 0* 8 5* 9 6* 8 2* 9 4* 9 0* 8 9* 7 8*   < > 8 9*   HEMATOCRIT % 24 3* 26 1* 28 5* 24 7* 28 3* 26 3*  --  23 2*   < > 26 8*   MCV fL 98 97 98 97 97 93  --  96   < > 96   PLATELETS Thousands/uL 285 276 276 219 234 199  --  169   < > 186   MCH pg 32 4 31 7 33 0 32 0 32 2 31 8  --  32 1   < > 31 9   MCHC g/dL 32 9 32 6 33 7 33 2 33 2 34 2  --  33 6   < > 33 2   RDW % 17 9* 17 8* 17 7* 16 8* 17 2* 16 2*  --  15 4*   < > 16 0*   MPV fL 9 5 9 5 9 2 9 5 9 5 9 0  --  9 3   < > 9 4   NRBC AUTO /100 WBCs 0  --  0 0 0  --   --  0  --  0    < > = values in this interval not displayed         CMP:  Results from last 7 days   Lab Units 03/10/23  0459 03/09/23  0503 03/08/23  2346 03/08/23  0452 03/07/23  1429 03/06/23  0432 03/05/23  0431   POTASSIUM mmol/L 3 7 3 8 4 1 3 4* 4 1 3 1* 3 8   CHLORIDE mmol/L 107 110* 108 111* 110* 109* 110*   CO2 mmol/L 32 28 25 24 23 25 23   BUN mg/dL 7 8 7 4* 3* 4* 8   CREATININE mg/dL 1 00 1 00 1 14 0 85 0 95 0 84 0 86   CALCIUM mg/dL 7 9* 8 0* 8 5 7 8* 8 1* 7  6* 7 3*   AST U/L 21  --  27  --   --   --   --    ALT U/L 25  --  33  --   --   --   --    ALK PHOS U/L 48  --  58  --   --   --   --    EGFR ml/min/1 73sq m 72 72 62 84 77 85 84       BMP:  Results from last 7 days   Lab Units 03/10/23  0459 03/09/23  0503 03/08/23  2346 03/08/23  0452 03/07/23  1429 03/06/23  0432 03/05/23  0431   POTASSIUM mmol/L 3 7 3 8 4 1 3 4* 4 1 3 1* 3 8   CHLORIDE mmol/L 107 110* 108 111* 110* 109* 110*   CO2 mmol/L 32 28 25 24 23 25 23   BUN mg/dL 7 8 7 4* 3* 4* 8   CREATININE mg/dL 1 00 1 00 1 14 0 85 0 95 0 84 0 86   CALCIUM mg/dL 7 9* 8 0* 8 5 7 8* 8 1* 7 6* 7 3*       Lab Results   Component Value Date    NTBNP 211 01/04/2023    NTBNP 284 (H) 12/01/2021    NTBNP 191 (H) 04/06/2020             Results from last 7 days   Lab Units 03/10/23  0459 03/09/23  0503 03/08/23  2346 03/08/23  0452   MAGNESIUM mg/dL 2 0 1 9 1 6* 1 8*                 Results from last 7 days   Lab Units 03/09/23  0503   TSH 3RD GENERATON uIU/mL 5 668*       Results from last 7 days   Lab Units 03/08/23  2346 03/04/23  2132   INR  0 90 1 11       Lipid Profile:   Lab Results   Component Value Date    CHOL 139 07/16/2015    CHOL 178 02/12/2015    CHOL 155 09/29/2014     Lab Results   Component Value Date    HDL 45 01/16/2023    HDL 36 (L) 09/13/2021    HDL 29 (L) 02/21/2020     Lab Results   Component Value Date    LDLCALC 117 (H) 01/16/2023    LDLCALC 66 09/13/2021    LDLCALC 63 02/21/2020     Lab Results   Component Value Date    TRIG 206 (H) 01/16/2023    TRIG 190 (H) 09/13/2021    TRIG 149 02/21/2020       Cardiac testing:   Results for orders placed during the hospital encounter of 01/04/21    Echo complete with contrast if indicated    Narrative  [facility] [address]  [address], [place]  [phone]    Transthoracic Echocardiogram  2D, M-mode, Doppler, and Color Doppler    Study date:  04-Jan-2021    Patient: Lily Bolanos  MR number: [id number]  Account number: [id number]  : 1946  Age: 76 years  Gender: Male  Status: Outpatient  Location: Echo lab  Height: 68 in  Weight: 237 6 lb  BP: 148/ 68 mmHg    Indications: aortic stenosis    Diagnoses: 424 1 - AORTIC VALVE DISORDER    Sonographer:  Koby Gregorio RDCS  Primary Physician:  Renny Kussmaul, DO  Referring Physician:  Cheri Blood MD  Group:  [facility]  Interpreting Physician:  John Valle MD    SUMMARY    LEFT VENTRICLE:  Systolic function was normal by visual assessment  Ejection fraction was estimated to be 70 %  There were no regional wall motion abnormalities  Wall thickness was mildly increased  There was mild concentric hypertrophy  Doppler parameters were consistent with abnormal left ventricular relaxation (grade 1 diastolic dysfunction)  LEFT ATRIUM:  The atrium was mildly dilated  AORTIC VALVE:  The valve was trileaflet  Leaflets exhibited markedly increased thickness, marked calcification, and markedly reduced cuspal separation  Transaortic velocity was increased due to valvular stenosis  There was moderate stenosis by hemodynamics  There was trace regurgitation  Valve peak gradient was 34 mmHg  Valve mean gradient was 20 mmHg  The aortic valve obstructive index (by VTI) was 0 38     TRICUSPID VALVE:  There was trace regurgitation  HISTORY: PRIOR HISTORY: diabetes mellitus, hypertension, mixed hyperlipidemia, NSTEMI, coronary artery disease-stent, obese body habitus    PROCEDURE: The procedure was performed in the echo lab  This was a routine study  The transthoracic approach was used  The study included complete 2D imaging, M-mode, complete spectral Doppler, and color Doppler  Images were obtained from  the parasternal, apical, subcostal, and suprasternal notch acoustic windows  Echocardiographic views were limited due to poor acoustic window availability, decreased penetration, and lung interference  This was a technically difficult  study      LEFT VENTRICLE: Size was normal  Systolic function was normal by visual assessment  Ejection fraction was estimated to be 70 %  There were no regional wall motion abnormalities  Wall thickness was mildly increased  There was mild  concentric hypertrophy  DOPPLER: Doppler parameters were consistent with abnormal left ventricular relaxation (grade 1 diastolic dysfunction)  RIGHT VENTRICLE: The size was normal  Systolic function was normal  Wall thickness was normal     LEFT ATRIUM: The atrium was mildly dilated  RIGHT ATRIUM: Size was normal     MITRAL VALVE: Valve structure was normal  There was normal leaflet separation  DOPPLER: The transmitral velocity was within the normal range  There was no evidence for stenosis  There was no significant regurgitation  AORTIC VALVE: The valve was trileaflet  Leaflets exhibited markedly increased thickness, marked calcification, and markedly reduced cuspal separation  DOPPLER: Transaortic velocity was increased due to valvular stenosis  There was moderate  stenosis by hemodynamics  There was trace regurgitation  TRICUSPID VALVE: The valve structure was normal  There was normal leaflet separation  DOPPLER: The transtricuspid velocity was within the normal range  There was no evidence for stenosis  There was trace regurgitation  PULMONIC VALVE: Leaflets exhibited normal thickness, no calcification, and normal cuspal separation  DOPPLER: The transpulmonic velocity was within the normal range  There was no significant regurgitation  PERICARDIUM: There was no pericardial effusion  The pericardium was normal in appearance  AORTA: The root exhibited normal size  SYSTEMIC VEINS: IVC: The inferior vena cava was normal in size   Respirophasic changes were normal     MEASUREMENT TABLES    DOPPLER MEASUREMENTS  Aortic valve   (Reference normals)  Peak giovanna   293 cm/s   (--)  Peak gradient   34 mmHg   (--)  Mean gradient   20 mmHg   (--)  Valve area, cont   1 3 cmï¾² (--)  Obstr index, VTI   0 38    (--)    OTHER ECHO MEASUREMENTS  (Reference normals)  Estimated CVP   5 mmHg   (--)    SYSTEM MEASUREMENT TABLES    2D  %FS: 61 05 %  Ao Diam: 3 52 cm  EDV(Teich): 98 29 ml  EF(Teich): 90 12 %  ESV(Teich): 9 71 ml  IVSd: 1 27 cm  LA Area: 16 45 cm2  LA Diam: 4 47 cm  LVEDV MOD A4C: 85 88 ml  LVEF MOD A4C: 44 15 %  LVESV MOD A4C: 47 97 ml  LVIDd: 4 62 cm  LVIDs: 1 8 cm  LVLd A4C: 8 7 cm  LVLs A4C: 7 45 cm  LVPWd: 1 26 cm  RA Area: 19 73 cm2  RVIDd: 2 87 cm  RWT: 0 55  SV MOD A4C: 37 91 ml  SV(Teich): 88 58 ml    CW  AR Dec [place]: 1 73 m/s2  AR Dec Time: 2071 56 ms  AR PHT: 600 75 ms  AR Vmax: 3 57 m/s  AR maxP 08 mmHg  AV Env  Ti: 274 02 ms  AV VTI: 59 83 cm  AV Vmax: 2 93 m/s  AV Vmean: 2 19 m/s  AV maxP 26 mmHg  AV meanP 64 mmHg  PV Vmax: 0 88 m/s  PV maxPG: 3 07 mmHg  TR Vmax: 2 05 m/s  TR maxP 82 mmHg    MM  TAPSE: 2 67 cm    PW  E' Lat: 0 08 m/s  E' Sept: 0 06 m/s  E/E' Lat: 7 81  E/E' Sept: 10 28  LVOT Env  Ti: 294 96 ms  LVOT VTI: 23 79 cm  LVOT Vmax: 1 04 m/s  LVOT Vmean: 0 81 m/s  LVOT maxP 31 mmHg  LVOT meanP 79 mmHg  MV A Jose: 0 97 m/s  MV Dec [place]: 1 52 m/s2  MV DecT: 412 31 ms  MV E Jose: 0 63 m/s  MV E/A Ratio: 0 65  RVOT Vmax: 0 69 m/s  RVOT maxP 88 mmHg    [facility] Accredited [facility] Laboratory    Prepared and electronically signed by    Rosa Diaz MD  Signed 2021 16:07:07    No results found for this or any previous visit  No results found for this or any previous visit  No valid procedures specified  No results found for this or any previous visit          Meds/Allergies   current meds:   Current Facility-Administered Medications   Medication Dose Route Frequency   • acetaminophen (TYLENOL) tablet 650 mg  650 mg Oral Q6H PRN   • allopurinol (ZYLOPRIM) tablet 100 mg  100 mg Oral Daily   • aluminum-magnesium hydroxide-simethicone (MYLANTA) oral suspension 30 mL  30 mL Oral Q4H PRN   • atorvastatin (LIPITOR) tablet 80 mg  80 mg Oral Daily With Dinner   • clopidogrel (PLAVIX) tablet 75 mg  75 mg Oral Daily   • Empagliflozin (JARDIANCE) tablet 10 mg  10 mg Oral QAM   • escitalopram (LEXAPRO) tablet 10 mg  10 mg Oral Daily   • insulin lispro (HumaLOG) 100 units/mL subcutaneous injection 2-12 Units  2-12 Units Subcutaneous TID AC   • insulin lispro (HumaLOG) 100 units/mL subcutaneous injection 2-12 Units  2-12 Units Subcutaneous HS   • levothyroxine tablet 88 mcg  88 mcg Oral Early Morning   • morphine injection 2 mg  2 mg Intravenous Q3H PRN   • multivitamin-minerals (CENTRUM) tablet 1 tablet  1 tablet Oral Daily   • nitroglycerin (NITROSTAT) SL tablet 0 4 mg  0 4 mg Sublingual Q5 Min PRN   • ondansetron (ZOFRAN) injection 4 mg  4 mg Intravenous Q6H PRN   • pantoprazole (PROTONIX) injection 40 mg  40 mg Intravenous Q24H   • polyethylene glycol (MIRALAX) packet 17 g  17 g Oral Daily PRN   • ranolazine (RANEXA) 12 hr tablet 500 mg  500 mg Oral BID   • sodium chloride 0 9 % bolus 500 mL  500 mL Intravenous Once   • tamsulosin (FLOMAX) capsule 0 4 mg  0 4 mg Oral Daily With Dinner     Medications Prior to Admission   Medication   • allopurinol (ZYLOPRIM) 100 mg tablet   • allopurinol (ZYLOPRIM) 100 mg tablet   • atorvastatin (LIPITOR) 80 mg tablet   • benzonatate (TESSALON) 200 MG capsule   • Blood Glucose Monitoring Suppl (OneTouch Verio Reflect) w/Device KIT   • clopidogrel (PLAVIX) 75 mg tablet   • Empagliflozin (Jardiance) 10 MG TABS   • escitalopram (LEXAPRO) 20 mg tablet   • glimepiride (AMARYL) 4 mg tablet   • glucose blood (OneTouch Verio) test strip   • levothyroxine 88 mcg tablet   • levothyroxine 88 mcg tablet   • losartan (COZAAR) 25 mg tablet   • metFORMIN (GLUCOPHAGE) 1000 MG tablet   • multivitamin (THERAGRAN) TABS   • nitroglycerin (NITROSTAT) 0 4 mg SL tablet   • OneTouch Delica Lancets 27Z MISC   • pantoprazole (PROTONIX) 40 mg tablet   • prednisoLONE acetate (PRED FORTE) 1 % ophthalmic suspension • propranolol (INDERAL LA) 60 mg 24 hr capsule   • ranolazine (RANEXA) 500 mg 12 hr tablet   • tamsulosin (FLOMAX) 0 4 mg   • torsemide (DEMADEX) 10 mg tablet   • zonisamide (ZONEGRAN) 50 MG capsule            Counseling / Coordination of Care  Total floor / unit time spent today 20 minutes  Greater than 50% of total time was spent with the patient and / or family counseling and / or coordination of care  ** Please Note: Dragon 360 Dictation voice to text software may have been used in the creation of this document   ** supervision

## 2025-01-27 NOTE — PROGRESS NOTE ADULT - PROBLEM SELECTOR PLAN 3
RESOLVED   reported confusion by wife (denied by patient) per wife is resolved and pt AOx4 w/ nonfocal neuro exam at this time. Suspect i/s/o infxn above

## 2025-01-27 NOTE — PROGRESS NOTE ADULT - PROBLEM SELECTOR PLAN 10
reports current mood as good  - c/w bupropion, venlafaxine

## 2025-01-27 NOTE — PROGRESS NOTE ADULT - PROBLEM SELECTOR PLAN 1
SIRS+ (temp, WBC) i/s/o UA c/f infxn (has hx UTIs, denies MDRO)   CXR w/o explanation  no abd/CVA tenderness on exam, POCUS ED kidney & bladder w/o overt abn  - c/w CTX last dose 1/26  - monitor VS  - outpatient f/u proteinuria/blood  - f/u blood cx (+ stap epi in 1 bottle)   - f/u repeat BCx   - f/u urine cx stap epi  - reviewed TTE normal LV systolic function no significant valvular disease   - ID consulted, rec: c/w ceftriaxone
SIRS+ (temp, WBC) i/s/o UA c/f infxn (has hx UTIs, denies MDRO)   CXR w/o explanation  no abd/CVA tenderness on exam, POCUS ED kidney & bladder w/o overt abn  - c/w CTX is s/p 2.1L IVF in ED, lactate improved,   - monitor VS  - outpatient f/u proteinuria/blood  - f/u blood cx (pending results)  - fu urine cx (pending results)
SIRS+ (temp, WBC) i/s/o UA c/f infxn (has hx UTIs, denies MDRO)   CXR w/o explanation  no abd/CVA tenderness on exam, POCUS ED kidney & bladder w/o overt abn  - c/w CTX last dose 1/26  - monitor VS  - outpatient f/u proteinuria/blood  - f/u blood cx (+ stap epi in 1 bottle) pending results for repeat blood cx (likely contaminated however given urine cx also grew same bacteria will consult ID for input.   - fu urine cx stap epi

## 2025-01-27 NOTE — PHYSICAL THERAPY INITIAL EVALUATION ADULT - GENERAL OBSERVATIONS, REHAB EVAL
Pt received sitting edge of bed, A&0x4, following 100% multi-step commands. Pt presenting to [facility] for urinary frequency and urgency x few days. Pt admitted with acute UTI.

## 2025-01-28 ENCOUNTER — TRANSCRIPTION ENCOUNTER (OUTPATIENT)
Age: 70
End: 2025-01-28

## 2025-01-28 VITALS
SYSTOLIC BLOOD PRESSURE: 109 MMHG | RESPIRATION RATE: 18 BRPM | HEART RATE: 88 BPM | TEMPERATURE: 99 F | OXYGEN SATURATION: 96 % | DIASTOLIC BLOOD PRESSURE: 70 MMHG

## 2025-01-28 LAB
GLUCOSE BLDC GLUCOMTR-MCNC: 109 MG/DL — HIGH (ref 70–99)
GLUCOSE BLDC GLUCOMTR-MCNC: 114 MG/DL — HIGH (ref 70–99)
GLUCOSE BLDC GLUCOMTR-MCNC: 119 MG/DL — HIGH (ref 70–99)
GLUCOSE BLDC GLUCOMTR-MCNC: 123 MG/DL — HIGH (ref 70–99)

## 2025-01-28 PROCEDURE — 93306 TTE W/DOPPLER COMPLETE: CPT

## 2025-01-28 PROCEDURE — 83036 HEMOGLOBIN GLYCOSYLATED A1C: CPT

## 2025-01-28 PROCEDURE — 87086 URINE CULTURE/COLONY COUNT: CPT

## 2025-01-28 PROCEDURE — 76770 US EXAM ABDO BACK WALL COMP: CPT

## 2025-01-28 PROCEDURE — 82746 ASSAY OF FOLIC ACID SERUM: CPT

## 2025-01-28 PROCEDURE — 71045 X-RAY EXAM CHEST 1 VIEW: CPT

## 2025-01-28 PROCEDURE — 82962 GLUCOSE BLOOD TEST: CPT

## 2025-01-28 PROCEDURE — 82803 BLOOD GASES ANY COMBINATION: CPT

## 2025-01-28 PROCEDURE — 80053 COMPREHEN METABOLIC PANEL: CPT

## 2025-01-28 PROCEDURE — 80048 BASIC METABOLIC PNL TOTAL CA: CPT

## 2025-01-28 PROCEDURE — 85018 HEMOGLOBIN: CPT

## 2025-01-28 PROCEDURE — 81001 URINALYSIS AUTO W/SCOPE: CPT

## 2025-01-28 PROCEDURE — 87077 CULTURE AEROBIC IDENTIFY: CPT

## 2025-01-28 PROCEDURE — 85025 COMPLETE CBC W/AUTO DIFF WBC: CPT

## 2025-01-28 PROCEDURE — 85014 HEMATOCRIT: CPT

## 2025-01-28 PROCEDURE — 99285 EMERGENCY DEPT VISIT HI MDM: CPT

## 2025-01-28 PROCEDURE — 96374 THER/PROPH/DIAG INJ IV PUSH: CPT

## 2025-01-28 PROCEDURE — 83605 ASSAY OF LACTIC ACID: CPT

## 2025-01-28 PROCEDURE — 87637 SARSCOV2&INF A&B&RSV AMP PRB: CPT

## 2025-01-28 PROCEDURE — 99239 HOSP IP/OBS DSCHRG MGMT >30: CPT

## 2025-01-28 PROCEDURE — G0545: CPT

## 2025-01-28 PROCEDURE — 36415 COLL VENOUS BLD VENIPUNCTURE: CPT

## 2025-01-28 PROCEDURE — 0241U: CPT

## 2025-01-28 PROCEDURE — 85027 COMPLETE CBC AUTOMATED: CPT

## 2025-01-28 PROCEDURE — 82607 VITAMIN B-12: CPT

## 2025-01-28 PROCEDURE — 84295 ASSAY OF SERUM SODIUM: CPT

## 2025-01-28 PROCEDURE — 83880 ASSAY OF NATRIURETIC PEPTIDE: CPT

## 2025-01-28 PROCEDURE — 85610 PROTHROMBIN TIME: CPT

## 2025-01-28 PROCEDURE — 97161 PT EVAL LOW COMPLEX 20 MIN: CPT

## 2025-01-28 PROCEDURE — 83735 ASSAY OF MAGNESIUM: CPT

## 2025-01-28 PROCEDURE — 85379 FIBRIN DEGRADATION QUANT: CPT

## 2025-01-28 PROCEDURE — 84132 ASSAY OF SERUM POTASSIUM: CPT

## 2025-01-28 PROCEDURE — 82435 ASSAY OF BLOOD CHLORIDE: CPT

## 2025-01-28 PROCEDURE — 85730 THROMBOPLASTIN TIME PARTIAL: CPT

## 2025-01-28 PROCEDURE — 87186 SC STD MICRODIL/AGAR DIL: CPT

## 2025-01-28 PROCEDURE — 84100 ASSAY OF PHOSPHORUS: CPT

## 2025-01-28 PROCEDURE — 84443 ASSAY THYROID STIM HORMONE: CPT

## 2025-01-28 PROCEDURE — 82947 ASSAY GLUCOSE BLOOD QUANT: CPT

## 2025-01-28 PROCEDURE — 87040 BLOOD CULTURE FOR BACTERIA: CPT

## 2025-01-28 PROCEDURE — 87150 DNA/RNA AMPLIFIED PROBE: CPT

## 2025-01-28 PROCEDURE — 84484 ASSAY OF TROPONIN QUANT: CPT

## 2025-01-28 PROCEDURE — 82330 ASSAY OF CALCIUM: CPT

## 2025-01-28 PROCEDURE — 99232 SBSQ HOSP IP/OBS MODERATE 35: CPT

## 2025-01-28 RX ORDER — SOLIFENACIN SUCCINATE 10 MG/1
1 TABLET, FILM COATED ORAL
Refills: 0 | DISCHARGE

## 2025-01-28 RX ORDER — CLONIDINE HYDROCHLORIDE 0.2 MG/1
1 TABLET ORAL
Refills: 0 | DISCHARGE

## 2025-01-28 RX ORDER — CEFTRIAXONE 250 MG/1
1000 INJECTION, POWDER, FOR SOLUTION INTRAMUSCULAR; INTRAVENOUS EVERY 24 HOURS
Refills: 0 | Status: DISCONTINUED | OUTPATIENT
Start: 2025-01-28 | End: 2025-01-28

## 2025-01-28 RX ORDER — CEFUROXIME AXETIL 500 MG
1 TABLET ORAL
Qty: 6 | Refills: 0
Start: 2025-01-28 | End: 2025-01-30

## 2025-01-28 RX ORDER — METFORMIN HYDROCHLORIDE 1000 MG/1
1 TABLET, COATED ORAL
Qty: 30 | Refills: 0
Start: 2025-01-28 | End: 2025-02-26

## 2025-01-28 RX ORDER — EMPAGLIFLOZIN, METFORMIN HYDROCHLORIDE 10; 1000 MG/1; MG/1
1 TABLET, EXTENDED RELEASE ORAL
Refills: 0 | DISCHARGE

## 2025-01-28 RX ORDER — OXYBUTYNIN CHLORIDE 5 MG/1
2 TABLET, EXTENDED RELEASE ORAL
Qty: 0 | Refills: 0 | DISCHARGE
Start: 2025-01-28

## 2025-01-28 RX ORDER — HYDROCHLOROTHIAZIDE 50 MG
1 TABLET ORAL
Refills: 0 | DISCHARGE

## 2025-01-28 RX ORDER — AMLODIPINE BESYLATE/BENAZEPRIL 10 MG-20MG
1 CAPSULE ORAL
Refills: 0 | DISCHARGE

## 2025-01-28 RX ADMIN — Medication 150 MILLIGRAM(S): at 10:53

## 2025-01-28 RX ADMIN — BUPROPION HYDROCHLORIDE 300 MILLIGRAM(S): 150 TABLET, EXTENDED RELEASE ORAL at 10:53

## 2025-01-28 RX ADMIN — Medication 5 MILLIGRAM(S): at 10:53

## 2025-01-28 RX ADMIN — LINACLOTIDE 290 MICROGRAM(S): 290 CAPSULE, GELATIN COATED ORAL at 05:13

## 2025-01-28 RX ADMIN — OXYBUTYNIN CHLORIDE 10 MILLIGRAM(S): 5 TABLET, EXTENDED RELEASE ORAL at 17:37

## 2025-01-28 RX ADMIN — Medication 300 MILLIGRAM(S): at 10:53

## 2025-01-28 RX ADMIN — OXYBUTYNIN CHLORIDE 10 MILLIGRAM(S): 5 TABLET, EXTENDED RELEASE ORAL at 05:13

## 2025-01-28 RX ADMIN — CEFTRIAXONE 100 MILLIGRAM(S): 250 INJECTION, POWDER, FOR SOLUTION INTRAMUSCULAR; INTRAVENOUS at 10:56

## 2025-01-28 NOTE — DISCHARGE NOTE PROVIDER - CARE PROVIDER_API CALL
Latrell Tobin  Pulmonary Disease  [address] [place] [address]  Phone: [phone]  Fax: [phone]  Established Patient  Follow Up Time: 1 week

## 2025-01-28 NOTE — DISCHARGE NOTE PROVIDER - HOSPITAL COURSE
HPI:  69M PMH T2DM, HTN, HLD, MDD, previous UTIs (denies hx MDRO), RAMANDEEP (not using device) p/w urinary frequency. AOx4, reports baseline health when he began to experience urinary frequency accompanied by malodorous urine x 1 day. Not endorsing fever, chills, HA, lightheadedness, visual disturbance, CP, palpitations, cough, SOB, abdominal pain, n/v/d, melena, hematochezia, dysuria, hematuria, change in urinary appearance, or other complaints. Denies recent travel, known sick contact, or immobility. Of note, EMS report described that wife contacted d/t confusion however patient denies any confusion. Contacted wife via phone for collateral, reports that patient had difficulty navigating himself from bathroom to bed this AM, was slightly disoriented however she states that he is currently back to baseline.    tmax 38.1C, SpO2% 89-97 on 1-2L NC    WBC 24.63 (neutrophil predominant); PT/INR prolonged; K 3.4, BUN/SCr 34/1.17; HST 27, proBNP 1324; lactate 2.2 -> 1.4     UA mod LE, 220 WBC, many bacteria, ketonuria, glucosuria, protein, small blood (4 RBC)    CXR: Left chest wall deformity. Right basilar atelectasis. No focal consolidation or significant pleural effusion is seen.    POCUS ED Kidney & Bladder: No hydronephrosis present in the left and right kidneys.    s/p CTX 1g, KCl 40meq PO, tylenol 650mg PO, LR 2.1L  (24 Jan 2025 23:28)    Hospital Course:  # Staph epidermidis UTI   # Staph epidermidis bacteremia   - repeat BCx x2 1/26 NGTD   - reviewed TTE, no evidence of vegetations  - s/p ceftriaxone 1/24--1/28, discuss with ID Dr. Guzman, jamilah for discharge and continue ceftin 500mg BID x3 days     # Diabetes type 2   - given severe infection with UTI, will hold Synjardy (empagliflozin-metformin) on discharge, given empagliflozin can increase risk of UTI. At length discussion with patient and wife Marcia, advise patient to followup with PCP to discuss risk vs. benefits of resuming empagliflozin in the future.     # HTN   - BP initially soft in setting of sepsis, several antihypertensive held, patient's BP WNL while inpatient   - on discharge, will continue to hold  amlodipine, benazepril, hydrochlorothiazide ---> at length discussion with wife fátima Kennedy followup with PCP to discuss need to resume above medications.     #Imaging abnormality.   - CXR read as L chest wall deformity, exam unrevealing, previous hospitalist contacted radiology to discuss who states likely congenital or prior fx. Recommend PCP f/u on d/c.    # Acute hypoxic respiratory failure ---> RESOLVED   # Metabolic encephalopathy ---> likely 2/2 sepsis from UTI, bacteremia, now RESOLVED      Important Medication Changes and Reason:    Active or Pending Issues Requiring Follow-up:    Daily     Daily   Advanced Directives:   [ ] Full code  [ ] DNR  [ ] Hospice    Discharge Diagnoses:

## 2025-01-28 NOTE — DISCHARGE NOTE NURSING/CASE MANAGEMENT/SOCIAL WORK - PATIENT PORTAL LINK FT
You can access the FollowMyHealth Patient Portal offered by [facility] by registering at the following website: [email or web address]. By joining [employer]’s FollowMyHealth portal, you will also be able to view your health information using other applications (apps) compatible with our system.

## 2025-01-28 NOTE — DISCHARGE NOTE PROVIDER - NSDCCPTREATMENT_GEN_ALL_CORE_FT
PRINCIPAL PROCEDURE  Procedure: 2D echo  Findings and Treatment: CONCLUSIONS:      1. Left ventricular cavity is normal in size. Left ventricular systolic function is normal with an ejection fraction visually estimated at 65 to 70 %. There are no regional wall motion abnormalities seen.   2. Mildly enlarged right ventricular cavity size and normal right ventricular systolic function.   3. No significant valvular disease.   4. No pericardial effusion seen.   5. No prior echocardiogram is available for comparison.

## 2025-01-28 NOTE — DISCHARGE NOTE PROVIDER - POSTFACE STATEMENT FOR MINUTES SPENT
Impression: Ocular hypertension, bilateral: H40.053. Plan: OCT of RNFL ordered and performed today. RNFL normal OU. Monitor without drops for now.  
f/u 1yr IOP OCT minutes on the discharge service.

## 2025-01-28 NOTE — DISCHARGE NOTE PROVIDER - NSDCFUSCHEDAPPT_GEN_ALL_CORE_FT
Joni Potter Physician [facility]  ORTHOSURG [address] [facility]  Scheduled Appointment: 02/27/2025

## 2025-01-28 NOTE — DISCHARGE NOTE PROVIDER - NSDCMRMEDTOKEN_GEN_ALL_CORE_FT
allopurinol 300 mg oral tablet: 1 tab(s) orally once a day  buPROPion 300 mg/24 hours (XL) oral tablet, extended release: 1 tab(s) orally once a day  cefuroxime 500 mg oral tablet: 1 tab(s) orally 2 times a day  cloNIDine 0.1 mg/24 hr transdermal film, extended release: 1 patch transdermally every 7 days  doxazosin 2 mg oral tablet: 1 tab(s) orally once a day  finasteride 5 mg oral tablet: 1 tab(s) orally once a day  Linzess 290 mcg oral capsule: 1 cap(s) orally once a day  MetFORMIN (Eqv-Fortamet) 1000 mg oral tablet, extended release: 1 tab(s) orally once a day  oxyBUTYnin 5 mg oral tablet: 2 tab(s) orally 2 times a day  semaglutide:   simvastatin 20 mg oral tablet: 1 tab(s) orally once a day  tamsulosin 0.4 mg oral capsule: 1 cap(s) orally once a day  Toprol- mg oral tablet, extended release: 1 tab(s) orally once a day  venlafaxine 150 mg oral capsule, extended release: 1 cap(s) orally once a day

## 2025-01-28 NOTE — PROGRESS NOTE ADULT - SUBJECTIVE AND OBJECTIVE BOX
Patient is a 69y old  Male who presents with a chief complaint of UTI (26 Jan 2025 12:35)    f/u bacteremia / UTI    Interval History/ROS:  no fever.  no dysuria.  Remainder of ROS otherwise negative.    PAST MEDICAL & SURGICAL HISTORY:  Diabetes  Hypertension  HLD (hyperlipidemia)  MDD (major depressive disorder)  Gastric ulcer  S/P cholecystectomy    Allergies  No Known Allergies    ANTIMICROBIALS:  cefTRIAXone   IVPB 1000 every 24 hours (1/24-)    MEDICATIONS  (STANDING):  allopurinol 300 daily  atorvastatin 10 at bedtime  buPROPion XL (24-Hour) . 300 daily  cloNIDine Patch 0.1 mG/24Hr(s) 1 every 7 daysonce  finasteride 5 daily  heparin   Injectable 5000 every 8 hours  insulin lispro (ADMELOG) corrective regimen sliding scale  three times a day before meals  insulin lispro (ADMELOG) corrective regimen sliding scale  at bedtime  linaclotide 290 before breakfast  oxybutynin 10 two times a day  tamsulosin 0.4 at bedtime  venlafaxine XR. 150 daily    Vital Signs Last 24 Hrs  T(F): 97.4 (01-27-25 @ 12:36), Max: 99 (01-26-25 @ 20:41)  HR: 90 (01-27-25 @ 12:36)  BP: 117/85 (01-27-25 @ 12:36)  RR: 18 (01-27-25 @ 12:36)  SpO2: 96% (01-27-25 @ 12:36) (92% - 96%)  Wt(kg): --    PHYSICAL EXAM:  Constitutional: non-toxic, no distress  HEAD/EYES: anicteric  ENT:  supple  Cardiovascular:   normal S1, S2, no murmur, no edema  Respiratory:  clear BS bilaterally, no wheezes, no rales  GI:  soft, non-tender, normal bowel sounds  :  no knutson  Musculoskeletal:  no synovitis, normal ROM  Neurologic: awake and alert, normal strength, no focal findings  Skin:  no rash  Psychiatric:  awake, alert, appropriate mood                                13.0   8.86  )-----------( 219      ( 27 Jan 2025 06:51 )             39.1 01-27    141  |  102  |  22  ----------------------------<  124  3.9   |  24  |  0.93  Ca    9.5      27 Jan 2025 06:50Phos  3.1     01-27Mg     1.8     01-27    WBC Count: 9.92 (01-26-25 @ 07:09)  WBC Count: 15.72 (01-25-25 @ 01:36)  WBC Count: 24.63 (01-24-25 @ 11:41)    Creatinine: 0.92 (01-26)  Creatinine: 1.24 (01-25)  Creatinine: 1.17 (01-24)  Creatinine: 1.29 (01-24)    Urinalysis + Microscopic Examination (01.24.25 @ 14:31)   pH Urine: 5.5  Urine Appearance: Cloudy  Color: Yellow  Specific Gravity: >1.030  Protein, Urine: 30 mg/dL  Glucose Qualitative, Urine: >=1000 mg/dL  Ketone - Urine: 15 mg/dL  Blood, Urine: Small  Bilirubin: Negative  Urobilinogen: 0.2 mg/dL  Leukocyte Esterase Concentration: Moderate  Nitrite: Negative  Review: Reviewed  White Blood Cell - Urine: 220 /HPF  Red Blood Cell - Urine: 4 /HPF  Bacteria: Many /HPF  Cast: 0 /LPF  Epithelial Cells: 5 /HPF    MICROBIOLOGY:  1/26 BC pending    Culture - Urine (collected 01-24-25 @ 14:31)  Source: Clean Catch Clean Catch (Midstream)  Final Report (01-26-25 @ 15:07):    10,000 - 49,000 CFU/mL Staphylococcus epidermidis Susceptibility to    follow.    Culture - Blood (collected 01-24-25 @ 13:20)  Source: .Blood BLOOD  Preliminary Report (01-26-25 @ 18:01):    No growth at 48 Hours    Culture - Blood (collected 01-24-25 @ 13:19)  Source: .Blood BLOOD  Gram Stain (01-26-25 @ 04:23):    Growth in anaerobic bottle: Gram Positive Cocci in Clusters  Final Report (01-27-25 @ 11:02):    Growth in anaerobic bottle: Staphylococcus epidermidis    Isolation of Coagulase negative Staphylococcus from single blood culture    sets may represent    contamination. Contact the Microbiology Department at [phone] if    susceptibility testing is needed.    clinically indicated.    Direct identification is available within approximately 3-5    hours either by Blood Panel Multiplexed PCR or Direct    MALDI-TOF. Details: [email or web address]  Organism: Blood Culture PCR (01-27-25 @ 11:02)  Organism: Blood Culture PCR (01-27-25 @ 11:02)      Method Type: PCR      -  Staphylococcus epidermidis: Detec    RADIOLOGY:  imaging below personally reviewed and agree with findings    POCUS ED Kidney and Bladder (01.24.25 @ 20:13) >  IMPRESSION:  No hydronephrosis present in the left and right kidneys.    Xray Chest 1 View-PORTABLE IMMEDIATE (01.24.25 @ 12:44) >  IMPRESSION: Left chest wall deformity. Right basilar atelectasis. No focal consolidation or significant pleural effusion is seen.    MR Hip No Cont, Left (01.02.24 @ 21:36) >  IMPRESSION:  Mild proximal hamstring tendinosis at the conjoined tendon origin. No tear or retraction.  Mild left hip degenerative change.    ECHO:  TTE W or WO Ultrasound Enhancing Agent (01.27.25 @ 10:37) >  CONCLUSIONS:   1. Left ventricular cavity is normal in size. Left ventricular systolic function is normal with an ejection fraction visually estimated at 65 to 70 %. There are no regional wall motion abnormalities seen.   2. Mildly enlarged right ventricular cavity size and normal right ventricular systolic function.   3. No significant valvular disease.   4. No pericardial effusion seen.   5. No prior echocardiogram is available for comparison.  
Patient is a 69y old  Male who presents with a chief complaint of UTI (26 Jan 2025 12:35)    f/u bacteremia / UTI    Interval History/ROS:  no fever.  no dysuria.  repeat BC (-).  Remainder of ROS otherwise negative.    PAST MEDICAL & SURGICAL HISTORY:  Diabetes  Hypertension  HLD (hyperlipidemia)  MDD (major depressive disorder)  Gastric ulcer  S/P cholecystectomy    Allergies  No Known Allergies    ANTIMICROBIALS:  cefTRIAXone   IVPB 1000 every 24 hours (1/24-)    MEDICATIONS  (STANDING):  allopurinol 300 daily  atorvastatin 10 at bedtime  buPROPion XL (24-Hour) . 300 daily  cloNIDine Patch 0.1 mG/24Hr(s) 1 every 7 days  finasteride 5 daily  heparin   Injectable 5000 every 8 hours  insulin lispro (ADMELOG) corrective regimen sliding scale  three times a day before meals  insulin lispro (ADMELOG) corrective regimen sliding scale  at bedtime  linaclotide 290 before breakfast  oxybutynin 10 two times a day  tamsulosin 0.4 at bedtime  venlafaxine XR. 150 daily    Vital Signs Last 24 Hrs  T(F): 98.5 (01-28-25 @ 04:54), Max: 98.7 (01-27-25 @ 23:58)  HR: 72 (01-28-25 @ 04:54)  BP: 127/68 (01-28-25 @ 04:54)  RR: 18 (01-28-25 @ 04:54)  SpO2: 93% (01-28-25 @ 04:54) (93% - 96%)    PHYSICAL EXAM:  Constitutional: non-toxic, no distress  HEAD/EYES: anicteric  ENT:  supple  Cardiovascular:   normal S1, S2, no murmur, no edema  Respiratory:  clear BS bilaterally, no wheezes, no rales  GI:  soft, non-tender, normal bowel sounds  :  no knutson  Musculoskeletal:  no synovitis  Neurologic: awake and alert, normal strength, no focal findings  Skin:  no rash  Psychiatric:  awake, alert, appropriate mood                                       13.0   8.86  )-----------( 219      ( 27 Jan 2025 06:51 )             39.1 01-27    141  |  102  |  22  ----------------------------<  124  3.9   |  24  |  0.93  Ca    9.5      27 Jan 2025 06:50Phos  3.1     01-27Mg     1.8     01-27    WBC Count: 9.92 (01-26-25 @ 07:09)  WBC Count: 15.72 (01-25-25 @ 01:36)  WBC Count: 24.63 (01-24-25 @ 11:41)    Creatinine: 0.92 (01-26)  Creatinine: 1.24 (01-25)  Creatinine: 1.17 (01-24)  Creatinine: 1.29 (01-24)    Urinalysis + Microscopic Examination (01.24.25 @ 14:31)   pH Urine: 5.5  Urine Appearance: Cloudy  Color: Yellow  Specific Gravity: >1.030  Protein, Urine: 30 mg/dL  Glucose Qualitative, Urine: >=1000 mg/dL  Ketone - Urine: 15 mg/dL  Blood, Urine: Small  Bilirubin: Negative  Urobilinogen: 0.2 mg/dL  Leukocyte Esterase Concentration: Moderate  Nitrite: Negative  Review: Reviewed  White Blood Cell - Urine: 220 /HPF  Red Blood Cell - Urine: 4 /HPF  Bacteria: Many /HPF  Cast: 0 /LPF  Epithelial Cells: 5 /HPF    MICROBIOLOGY:  Culture - Blood (collected 01-26-25 @ 10:13)  Source: .Blood BLOOD  Preliminary Report (01-27-25 @ 13:02):    No growth at 24 hours    Culture - Blood (collected 01-26-25 @ 10:12)  Source: .Blood BLOOD  Preliminary Report (01-27-25 @ 13:02):    No growth at 24 hours    Culture - Urine (collected 01-24-25 @ 14:31)  Source: Clean Catch Clean Catch (Midstream)  Final Report (01-26-25 @ 15:07):    10,000 - 49,000 CFU/mL Staphylococcus epidermidis Susceptibility to    follow.    Culture - Blood (collected 01-24-25 @ 13:20)  Source: .Blood BLOOD  Preliminary Report (01-26-25 @ 18:01):    No growth at 48 Hours    Culture - Blood (collected 01-24-25 @ 13:19)  Source: .Blood BLOOD  Gram Stain (01-26-25 @ 04:23):    Growth in anaerobic bottle: Gram Positive Cocci in Clusters  Final Report (01-27-25 @ 11:02):    Growth in anaerobic bottle: Staphylococcus epidermidis    Isolation of Coagulase negative Staphylococcus from single blood culture    sets may represent    contamination. Contact the Microbiology Department at [phone] if    susceptibility testing is needed.    clinically indicated.    Direct identification is available within approximately 3-5    hours either by Blood Panel Multiplexed PCR or Direct    MALDI-TOF. Details: [email or web address]  Organism: Blood Culture PCR (01-27-25 @ 11:02)  Organism: Blood Culture PCR (01-27-25 @ 11:02)      Method Type: PCR      -  Staphylococcus epidermidis: Detec    RADIOLOGY:  imaging below personally reviewed and agree with findings    POCUS ED Kidney and Bladder (01.24.25 @ 20:13) >  IMPRESSION:  No hydronephrosis present in the left and right kidneys.    Xray Chest 1 View-PORTABLE IMMEDIATE (01.24.25 @ 12:44) >  IMPRESSION: Left chest wall deformity. Right basilar atelectasis. No focal consolidation or significant pleural effusion is seen.    MR Hip No Cont, Left (01.02.24 @ 21:36) >  IMPRESSION:  Mild proximal hamstring tendinosis at the conjoined tendon origin. No tear or retraction.  Mild left hip degenerative change.    ECHO:  TTE W or WO Ultrasound Enhancing Agent (01.27.25 @ 10:37) >  CONCLUSIONS:   1. Left ventricular cavity is normal in size. Left ventricular systolic function is normal with an ejection fraction visually estimated at 65 to 70 %. There are no regional wall motion abnormalities seen.   2. Mildly enlarged right ventricular cavity size and normal right ventricular systolic function.   3. No significant valvular disease.   4. No pericardial effusion seen.   5. No prior echocardiogram is available for comparison.  
Patient is a 69y old  Male who presents with a chief complaint of UTI (24 Jan 2025 23:28)      SUBJECTIVE / OVERNIGHT EVENTS: Patient seen and examined at bedside. States that he feels well denies any complaints. No events overnight. currently satting well on 2L NC     ROS:  All other review of systems negative    Allergies    No Known Allergies    Intolerances        MEDICATIONS  (STANDING):  allopurinol 300 milliGRAM(s) Oral daily  atorvastatin 10 milliGRAM(s) Oral at bedtime  buPROPion XL (24-Hour) . 300 milliGRAM(s) Oral daily  cefTRIAXone   IVPB 1000 milliGRAM(s) IV Intermittent every 24 hours  cloNIDine Patch 0.1 mG/24Hr(s) 1 patch Transdermal every 7 days  dextrose 5%. 1000 milliLiter(s) (100 mL/Hr) IV Continuous <Continuous>  dextrose 5%. 1000 milliLiter(s) (50 mL/Hr) IV Continuous <Continuous>  dextrose 50% Injectable 25 Gram(s) IV Push once  dextrose 50% Injectable 12.5 Gram(s) IV Push once  dextrose 50% Injectable 25 Gram(s) IV Push once  finasteride 5 milliGRAM(s) Oral daily  glucagon  Injectable 1 milliGRAM(s) IntraMuscular once  heparin   Injectable 5000 Unit(s) SubCutaneous every 8 hours  insulin lispro (ADMELOG) corrective regimen sliding scale   SubCutaneous three times a day before meals  insulin lispro (ADMELOG) corrective regimen sliding scale   SubCutaneous at bedtime  linaclotide 290 MICROGram(s) Oral before breakfast  oxybutynin 10 milliGRAM(s) Oral two times a day  potassium phosphate / sodium phosphate Powder (PHOS-NaK) 1 Packet(s) Oral once  tamsulosin 0.4 milliGRAM(s) Oral at bedtime  venlafaxine XR. 150 milliGRAM(s) Oral daily    MEDICATIONS  (PRN):  acetaminophen     Tablet .. 650 milliGRAM(s) Oral every 6 hours PRN Temp greater or equal to 38C (100.4F), Mild Pain (1 - 3)  dextrose Oral Gel 15 Gram(s) Oral once PRN Blood Glucose LESS THAN 70 milliGRAM(s)/deciliter      Vital Signs Last 24 Hrs  T(C): 37.3 (25 Jan 2025 04:49), Max: 38.1 (24 Jan 2025 11:55)  T(F): 99.1 (25 Jan 2025 04:49), Max: 100.6 (24 Jan 2025 11:55)  HR: 68 (25 Jan 2025 04:49) (64 - 89)  BP: 122/76 (25 Jan 2025 04:49) (105/64 - 144/74)  BP(mean): 77 (24 Jan 2025 19:25) (77 - 77)  RR: 18 (25 Jan 2025 04:49) (18 - 20)  SpO2: 96% (25 Jan 2025 04:49) (89% - 97%)    Parameters below as of 25 Jan 2025 04:49  Patient On (Oxygen Delivery Method): nasal cannula      CAPILLARY BLOOD GLUCOSE      POCT Blood Glucose.: 135 mg/dL (25 Jan 2025 08:40)    I&O's Summary      PHYSICAL EXAM:  GENERAL: NAD, well-developed+2L NC  HEAD:  Atraumatic, Normocephalic  EYES: EOMI, PERRLA, conjunctiva and sclera clear  NECK: Supple, No JVD  CHEST/LUNG: Clear to auscultation bilaterally; No wheeze  HEART: Regular rate and rhythm; No murmurs, rubs, or gallops  ABDOMEN: Soft, Nontender, Nondistended; Bowel sounds present  EXTREMITIES:  2+ Peripheral Pulses, No clubbing, cyanosis, or edema  NEUROLOGY: AAOx3, non-focal      LABS:                        13.7   15.72 )-----------( 213      ( 25 Jan 2025 01:36 )             40.6     01-25    140  |  104  |  35[H]  ----------------------------<  118[H]  3.6   |  24  |  1.24    Ca    9.2      25 Jan 2025 01:36  Phos  2.3     01-25  Mg     1.8     01-25    TPro  6.3  /  Alb  3.6  /  TBili  0.5  /  DBili  x   /  AST  14  /  ALT  19  /  AlkPhos  41  01-24    PT/INR - ( 25 Jan 2025 01:36 )   PT: 13.2 sec;   INR: 1.16 ratio         PTT - ( 24 Jan 2025 12:53 )  PTT:28.5 sec      Urinalysis Basic - ( 25 Jan 2025 01:36 )    Color: x / Appearance: x / SG: x / pH: x  Gluc: 118 mg/dL / Ketone: x  / Bili: x / Urobili: x   Blood: x / Protein: x / Nitrite: x   Leuk Esterase: x / RBC: x / WBC x   Sq Epi: x / Non Sq Epi: x / Bacteria: x        RADIOLOGY & ADDITIONAL TESTS:      Case Discussed with spouse over the phone 
PROGRESS NOTE:     Patient is a 69y old  Male who presents with a chief complaint of UTI (27 Jan 2025 12:48)      SUBJECTIVE / OVERNIGHT EVENTS:  Patient seen and evaluated at bedside.  Denies SOB at rest, chest pain, palpitations, abdominal pain, nausea/vomiting    ADDITIONAL REVIEW OF SYSTEMS:    MEDICATIONS  (STANDING):  allopurinol 300 milliGRAM(s) Oral daily  atorvastatin 10 milliGRAM(s) Oral at bedtime  buPROPion XL (24-Hour) . 300 milliGRAM(s) Oral daily  cefTRIAXone   IVPB 1000 milliGRAM(s) IV Intermittent every 24 hours  cloNIDine Patch 0.1 mG/24Hr(s) 1 patch Transdermal every 7 days  dextrose 5%. 1000 milliLiter(s) (100 mL/Hr) IV Continuous <Continuous>  dextrose 5%. 1000 milliLiter(s) (50 mL/Hr) IV Continuous <Continuous>  dextrose 50% Injectable 25 Gram(s) IV Push once  dextrose 50% Injectable 12.5 Gram(s) IV Push once  dextrose 50% Injectable 25 Gram(s) IV Push once  finasteride 5 milliGRAM(s) Oral daily  glucagon  Injectable 1 milliGRAM(s) IntraMuscular once  heparin   Injectable 5000 Unit(s) SubCutaneous every 8 hours  insulin lispro (ADMELOG) corrective regimen sliding scale   SubCutaneous three times a day before meals  insulin lispro (ADMELOG) corrective regimen sliding scale   SubCutaneous at bedtime  linaclotide 290 MICROGram(s) Oral before breakfast  oxybutynin 10 milliGRAM(s) Oral two times a day  tamsulosin 0.4 milliGRAM(s) Oral at bedtime  venlafaxine XR. 150 milliGRAM(s) Oral daily    MEDICATIONS  (PRN):  acetaminophen     Tablet .. 650 milliGRAM(s) Oral every 6 hours PRN Temp greater or equal to 38C (100.4F), Mild Pain (1 - 3)  dextrose Oral Gel 15 Gram(s) Oral once PRN Blood Glucose LESS THAN 70 milliGRAM(s)/deciliter      CAPILLARY BLOOD GLUCOSE      POCT Blood Glucose.: 124 mg/dL (27 Jan 2025 12:26)  POCT Blood Glucose.: 117 mg/dL (27 Jan 2025 08:45)  POCT Blood Glucose.: 124 mg/dL (26 Jan 2025 21:27)  POCT Blood Glucose.: 137 mg/dL (26 Jan 2025 16:54)    I&O's Summary    26 Jan 2025 07:01  -  27 Jan 2025 07:00  --------------------------------------------------------  IN: 840 mL / OUT: 500 mL / NET: 340 mL    27 Jan 2025 07:01  -  27 Jan 2025 14:57  --------------------------------------------------------  IN: 400 mL / OUT: 0 mL / NET: 400 mL        PHYSICAL EXAM:  Vital Signs Last 24 Hrs  T(C): 36.3 (27 Jan 2025 12:36), Max: 37.2 (26 Jan 2025 20:41)  T(F): 97.4 (27 Jan 2025 12:36), Max: 99 (26 Jan 2025 20:41)  HR: 90 (27 Jan 2025 12:36) (79 - 90)  BP: 117/85 (27 Jan 2025 12:36) (101/72 - 155/84)  BP(mean): --  RR: 18 (27 Jan 2025 12:36) (18 - 18)  SpO2: 96% (27 Jan 2025 12:36) (92% - 96%)    Parameters below as of 27 Jan 2025 09:02  Patient On (Oxygen Delivery Method): room air        CONSTITUTIONAL: NAD  RESPIRATORY: Normal respiratory effort; lungs are clear to auscultation bilaterally  CARDIOVASCULAR: Regular rate and rhythm, normal S1 and S2, no murmur/rub/gallop; No lower extremity edema.  ABDOMEN: Nontender to palpation, normoactive bowel sounds, no rebound/guarding  PSYCH: A+O to person, place, and time; affect appropriate    LABS:                        13.0   8.86  )-----------( 219      ( 27 Jan 2025 06:51 )             39.1     01-27    141  |  102  |  22  ----------------------------<  124[H]  3.9   |  24  |  0.93    Ca    9.5      27 Jan 2025 06:50  Phos  3.1     01-27  Mg     1.8     01-27            Urinalysis Basic - ( 27 Jan 2025 06:50 )  Color: x / Appearance: x / SG: x / pH: x  Gluc: 124 mg/dL / Ketone: x  / Bili: x / Urobili: x   Blood: x / Protein: x / Nitrite: x   Leuk Esterase: x / RBC: x / WBC x   Sq Epi: x / Non Sq Epi: x / Bacteria: x        Culture - Blood (collected 26 Jan 2025 10:13)  Source: .Blood BLOOD  Preliminary Report (27 Jan 2025 13:02):    No growth at 24 hours    Culture - Blood (collected 26 Jan 2025 10:12)  Source: .Blood BLOOD  Preliminary Report (27 Jan 2025 13:02):    No growth at 24 hours        RADIOLOGY & ADDITIONAL TESTS:  < from: TTE W or WO Ultrasound Enhancing Agent (01.27.25 @ 10:37) >   1. Left ventricular cavity is normal in size. Left ventricular systolic function is normal with an ejection fraction visually estimated at 65 to 70 %. There are no regional wall motion abnormalities seen.   2. Mildly enlarged right ventricular cavity size and normal right ventricular systolic function.   3. No significant valvular disease.   4. No pericardial effusion seen.   5. No prior echocardiogram is available for comparison.  < end of copied text >    
Patient is a 69y old  Male who presents with a chief complaint of UTI (25 Jan 2025 11:44)      SUBJECTIVE / OVERNIGHT EVENTS: Patient seen and examined at bedside. STates that he feels well denies any complaints.     ROS:  All other review of systems negative    Allergies    No Known Allergies    Intolerances        MEDICATIONS  (STANDING):  allopurinol 300 milliGRAM(s) Oral daily  atorvastatin 10 milliGRAM(s) Oral at bedtime  buPROPion XL (24-Hour) . 300 milliGRAM(s) Oral daily  cefTRIAXone   IVPB 1000 milliGRAM(s) IV Intermittent every 24 hours  cloNIDine Patch 0.1 mG/24Hr(s) 1 patch Transdermal every 7 days  dextrose 5%. 1000 milliLiter(s) (100 mL/Hr) IV Continuous <Continuous>  dextrose 5%. 1000 milliLiter(s) (50 mL/Hr) IV Continuous <Continuous>  dextrose 50% Injectable 25 Gram(s) IV Push once  dextrose 50% Injectable 12.5 Gram(s) IV Push once  dextrose 50% Injectable 25 Gram(s) IV Push once  finasteride 5 milliGRAM(s) Oral daily  glucagon  Injectable 1 milliGRAM(s) IntraMuscular once  heparin   Injectable 5000 Unit(s) SubCutaneous every 8 hours  insulin lispro (ADMELOG) corrective regimen sliding scale   SubCutaneous three times a day before meals  insulin lispro (ADMELOG) corrective regimen sliding scale   SubCutaneous at bedtime  linaclotide 290 MICROGram(s) Oral before breakfast  oxybutynin 10 milliGRAM(s) Oral two times a day  tamsulosin 0.4 milliGRAM(s) Oral at bedtime  venlafaxine XR. 150 milliGRAM(s) Oral daily    MEDICATIONS  (PRN):  acetaminophen     Tablet .. 650 milliGRAM(s) Oral every 6 hours PRN Temp greater or equal to 38C (100.4F), Mild Pain (1 - 3)  dextrose Oral Gel 15 Gram(s) Oral once PRN Blood Glucose LESS THAN 70 milliGRAM(s)/deciliter      Vital Signs Last 24 Hrs  T(C): 36.7 (26 Jan 2025 11:58), Max: 36.8 (25 Jan 2025 19:21)  T(F): 98.1 (26 Jan 2025 11:58), Max: 98.2 (25 Jan 2025 19:21)  HR: 74 (26 Jan 2025 11:58) (74 - 82)  BP: 124/76 (26 Jan 2025 11:58) (118/73 - 132/82)  BP(mean): --  RR: 18 (26 Jan 2025 11:58) (18 - 18)  SpO2: 92% (26 Jan 2025 11:58) (92% - 98%)    Parameters below as of 26 Jan 2025 11:58  Patient On (Oxygen Delivery Method): room air      CAPILLARY BLOOD GLUCOSE      POCT Blood Glucose.: 145 mg/dL (26 Jan 2025 12:29)  POCT Blood Glucose.: 130 mg/dL (26 Jan 2025 08:14)  POCT Blood Glucose.: 134 mg/dL (25 Jan 2025 20:54)  POCT Blood Glucose.: 113 mg/dL (25 Jan 2025 17:03)    I&O's Summary    25 Jan 2025 07:01  -  26 Jan 2025 07:00  --------------------------------------------------------  IN: 1320 mL / OUT: 250 mL / NET: 1070 mL    26 Jan 2025 07:01  -  26 Jan 2025 12:36  --------------------------------------------------------  IN: 240 mL / OUT: 500 mL / NET: -260 mL        PHYSICAL EXAM:  GENERAL: NAD, well-developed  HEAD:  Atraumatic, Normocephalic  EYES: EOMI, PERRLA, conjunctiva and sclera clear  NECK: Supple, No JVD  CHEST/LUNG: Clear to auscultation bilaterally; No wheeze  HEART: Regular rate and rhythm; No murmurs, rubs, or gallops  ABDOMEN: Soft, Nontender, Nondistended; Bowel sounds present  EXTREMITIES:  2+ Peripheral Pulses, No clubbing, cyanosis, or edema  NEUROLOGY: AAOx3, non-focal      LABS:                        14.1   9.92  )-----------( 200      ( 26 Jan 2025 07:09 )             42.7     01-26    137  |  100  |  27[H]  ----------------------------<  142[H]  4.0   |  21[L]  |  0.92    Ca    9.2      26 Jan 2025 07:10  Phos  2.3     01-25  Mg     1.8     01-25    TPro  6.3  /  Alb  3.6  /  TBili  0.5  /  DBili  x   /  AST  14  /  ALT  19  /  AlkPhos  41  01-24    PT/INR - ( 25 Jan 2025 01:36 )   PT: 13.2 sec;   INR: 1.16 ratio         PTT - ( 24 Jan 2025 12:53 )  PTT:28.5 sec      Urinalysis Basic - ( 26 Jan 2025 07:10 )    Color: x / Appearance: x / SG: x / pH: x  Gluc: 142 mg/dL / Ketone: x  / Bili: x / Urobili: x   Blood: x / Protein: x / Nitrite: x   Leuk Esterase: x / RBC: x / WBC x   Sq Epi: x / Non Sq Epi: x / Bacteria: x        RADIOLOGY & ADDITIONAL TESTS:      Care Discussed with Consultants/Other Providers: Medicine ACP

## 2025-01-28 NOTE — DISCHARGE NOTE PROVIDER - NSDCCPCAREPLAN_GEN_ALL_CORE_FT
PRINCIPAL DISCHARGE DIAGNOSIS  Diagnosis: Sepsis due to urinary tract infection  Assessment and Plan of Treatment: You were found to have severe urinary tract infection with this bacteria Staphylococcus epidermidis, this same bacteria was also found in your blood culture. You were treated with intravenous antibiotics and you improved quickly. Your repeat blood cultures x2 were clear of infection. We also performed an echocardiogram which showed normal cardiac function and no indication of infection around your heart. Infectious disease team followed you during your hospital stay. Given your improvement, we will discharge you home on oral antibiotics for an additional 3 days.   Please followup with your primary care physician within 1 week. If you develop fever, chills, abdominal pain/back pain/flank pain, foul smelling urine or pain/burning with urination, please call your primary care physician immediately or come back to the emergency room.      SECONDARY DISCHARGE DIAGNOSES  Diagnosis: Diabetes type 2  Assessment and Plan of Treatment: Prior to this hospitalization, you take this medication, Synjardy which is a combination of two medications Empagliflozin and metformin. Empagliflozin has known side effects of increasing risk of urinary tract infection. Given you have an episode of severe urinary tract infection still receiving antibiotics, we discontinued Synjardy on discharge.               You can continue to take metformin, and we wrote you a new prescription for metformin.   Please followup with your primary care physician and discuss risks vs. benefits of resuming empagliflozin in the future.    Diagnosis: Hypertension  Assessment and Plan of Treatment: When you first came to the hospital, your blood pressures were low in setting of severe infection. We held multiple of your high blood pressure medications, and your blood pressure were well controlled. On discharge, we discontinued your amlodipine, benazepril, and hydrochlorothiazide. Please follow up with your primary care physician and discuss necessity of resuming these medications.     PRINCIPAL DISCHARGE DIAGNOSIS  Diagnosis: Sepsis due to urinary tract infection  Assessment and Plan of Treatment: You were found to have severe urinary tract infection with this bacteria Staphylococcus epidermidis, this same bacteria was also found in your blood culture. You were treated with intravenous antibiotics and you improved quickly. Your repeat blood cultures x2 were clear of infection. We also performed an echocardiogram which showed normal cardiac function and no indication of infection around your heart. Infectious disease team followed you during your hospital stay. Given your improvement, we will discharge you home on oral antibiotics for an additional 3 days.   Please followup with your primary care physician within 1 week. If you develop fever, chills, abdominal pain/back pain/flank pain, foul smelling urine or pain/burning with urination, please call your primary care physician immediately or come back to the emergency room.      SECONDARY DISCHARGE DIAGNOSES  Diagnosis: Diabetes type 2  Assessment and Plan of Treatment: Prior to this hospitalization, you take this medication, Synjardy which is a combination of two medications Empagliflozin and metformin. Empagliflozin has known side effects of increasing risk of urinary tract infection. Given you have an episode of severe urinary tract infection still receiving antibiotics, we discontinued Synjardy on discharge.               You can continue to take metformin, and we wrote you a new prescription for metformin.   Please followup with your primary care physician and discuss risks vs. benefits of resuming empagliflozin in the future.    Diagnosis: Hypertension  Assessment and Plan of Treatment: When you first came to the hospital, your blood pressures were low in setting of severe infection. We held multiple of your high blood pressure medications, and your blood pressure were well controlled. On discharge, we discontinued your amlodipine, benazepril, and hydrochlorothiazide. Please follow up with your primary care physician and discuss necessity of resuming these medications.    Diagnosis: Chest wall deformity  Assessment and Plan of Treatment: We performed a chest xray which noted " There is a left chest wall deformity." Per medicine team's discussion with the radiologist, this is likely congenital or prior fracture. Recommend you followup with your primary care physician.

## 2025-01-28 NOTE — PROGRESS NOTE ADULT - ASSESSMENT
69M with T2DM, HTN, HLD, MDD, previous UTIs, RAMANDEEP (not using device) admitted 1/24/2025 c/o urinary frequency and malodorous urine and AMS. BIBA.  Tm 100.6.  WBC 24K. Ua with 220 WBC.  BC and UC sent.  Started empirically on ceftriaxone.  Sonogram (-) hydronephrosis.  Fever resolved.  WBC now 9K.  BC with Staph epi initially felt to be procurement contaminant, however, UC also (+) staph epi.  ID asked to help management.    Sepsis / UTI due to Staph epidermidis  - responding to ceftriaxone with resolution of leukocytosis and now afebrile  - TTE done (-)  - f/u 1/26 BC  - if negative, okay from ID standpoint to MA home on oral antibiotics  - ceftriaxone for now  - requested sensitivities from microbiology lab, will f/u results  
69M with T2DM, HTN, HLD, MDD, previous UTIs, RAMANDEEP (not using device) admitted 1/24/2025 c/o urinary frequency and malodorous urine and AMS. BIBA.  Tm 100.6.  WBC 24K. Ua with 220 WBC.  BC and UC sent.  Started empirically on ceftriaxone.  Sonogram (-) hydronephrosis.  Fever resolved.  WBC now 9K.  BC with Staph epi initially felt to be procurement contaminant, however, UC also (+) staph epi.  ID asked to help management.    Sepsis / UTI due to Staph epidermidis  - responding to ceftriaxone with resolution of leukocytosis and now afebrile  - TTE done (-)  - repeat BC from 1/26 remains negative  - okay from ID standpoint to ME home on oral antibiotics, ceftin 500mg bid for another 2-3 days    dispo  - d/c planning  
69M PMH T2DM, HTN, HLD, MDD, previous UTIs (denies hx MDRO), RAMANDEEP (not using device) a/w sepsis 2/2 UTI, c/f metabolic encephalopathy and found to be hypoxemic in ED c/f acute vs chronic hypoxemic respiratory failure 

## 2025-01-28 NOTE — CHART NOTE - NSCHARTNOTEFT_GEN_A_CORE
Assessed patient at bedside this morning, patient denies chest pain, denies SOB, denies heart palpitations, denies abdominal pain/flank pain/back pain, denies nausea/vomiting, denies dysuria.   Exam:  - General: sitting up in bed in no acute distress  - CV: regular rate/rhythm, no murmur/gallop/rubs  - Pulm: normal respiratory effort, no wheezes/crackles on auscultation   - Abd: soft, nondistended, nontender to palpation, normal active bowel sounds  - Neuro: symmetric face, normal speech, normal gait   - Psych: alert oriented x4     A & P:   # Staph epidermidis UTI   # Staph epidermidis bacteremia   - repeat BCx x2 1/26 NGTD   - reviewed TTE, no evidence of vegetations  - s/p ceftriaxone 1/24--1/28, discuss with ID jamilah Cronin for discharge and continue ceftin 500mg BID x3 days     # Diabetes type 2   - given severe infection with UTI, will hold Synjardy (empagliflozin-metformin) on discharge, given empagliflozin can increase risk of UTI. At length discussion with patient and wife Marcia, advise patient to followup with PCP to discuss risk vs. benefits of resuming empagliflozin in the future.     # HTN   - BP initially soft in setting of sepsis, several antihypertensive held, patient's BP WNL while inpatient   - on discharge, will continue to hold  amlodipine, benazepril, hydrochlorothiazide ---> at length discussion with wife Marcia , adivse followup with PCP to discuss need to resume above medications.     #Imaging abnormality.   - CXR read as L chest wall deformity, exam unrevealing, previous hospitalist contacted radiology to discuss who states likely congenital or prior fx. Recommend PCP f/u on d/c.    # Acute hypoxic respiratory failure ---> RESOLVED   # Metabolic encephalopathy ---> likely 2/2 sepsis from UTI, bacteremia, now RESOLVED    d/w ID Dr. Guzman   d/w medicine ACP   d/w patient and wife Marcia, all questions answered

## 2025-01-29 LAB
CULTURE RESULTS: SIGNIFICANT CHANGE UP
SPECIMEN SOURCE: SIGNIFICANT CHANGE UP

## 2025-01-31 LAB
CULTURE RESULTS: SIGNIFICANT CHANGE UP
CULTURE RESULTS: SIGNIFICANT CHANGE UP
SPECIMEN SOURCE: SIGNIFICANT CHANGE UP
SPECIMEN SOURCE: SIGNIFICANT CHANGE UP

## 2025-02-04 NOTE — CDI QUERY NOTE - NSCDIOTHERTXTBX_GEN_ALL_CORE_HH
Although sepsis is documented in the medical record, it lacks the specific indicators to clinically substantiate and support the diagnosis.   In order to ensure accurate coding and accuracy of the clinical record, the documentation in this patient’s medical record requires additional clarification.    QUERY:  Please clarify the status of sepsis in your progress note and/or discharge summary:  •	Sepsis ruled out after study  •	Sepsis ruled in (please document additional supporting information or mitigating factors to support this diagnosis)  •	Other (specify)  •	Not applicable     Supporting documentation:   ED vital signs:   T-98.5 --> 100.6 --> 99 --> 99.9   P-89 --> 76 --> 78   BP-197/73 -->121/83   R-20    WBC = 24.63  UA mod LE, 220 WBC, many bacteria  Urine cx 10-49 Staph epidermidis  Blood cx 1/4 staph epidermidis    ID progress note on 1/27/2025:  Sepsis / UTI due to Staph epidermidis  - responding to ceftriaxone with resolution of leukocytosis and now afebrile    Thank you.

## 2025-02-06 ENCOUNTER — NON-APPOINTMENT (OUTPATIENT)
Age: 70
End: 2025-02-06

## 2025-02-20 PROBLEM — K25.9 GASTRIC ULCER, UNSPECIFIED AS ACUTE OR CHRONIC, WITHOUT HEMORRHAGE OR PERFORATION: Chronic | Status: ACTIVE | Noted: 2025-01-25

## 2025-02-20 PROBLEM — E78.5 HYPERLIPIDEMIA, UNSPECIFIED: Chronic | Status: ACTIVE | Noted: 2025-01-25

## 2025-02-20 PROBLEM — I10 ESSENTIAL (PRIMARY) HYPERTENSION: Chronic | Status: ACTIVE | Noted: 2025-01-24

## 2025-02-20 PROBLEM — F32.9 MAJOR DEPRESSIVE DISORDER, SINGLE EPISODE, UNSPECIFIED: Chronic | Status: ACTIVE | Noted: 2025-01-25

## 2025-02-20 PROBLEM — E11.9 TYPE 2 DIABETES MELLITUS WITHOUT COMPLICATIONS: Chronic | Status: ACTIVE | Noted: 2025-01-24

## 2025-02-27 ENCOUNTER — APPOINTMENT (OUTPATIENT)
Dept: ORTHOPEDIC SURGERY | Facility: CLINIC | Age: 70
End: 2025-02-27
Payer: MEDICARE

## 2025-02-27 VITALS
BODY MASS INDEX: 25.43 KG/M2 | SYSTOLIC BLOOD PRESSURE: 107 MMHG | DIASTOLIC BLOOD PRESSURE: 73 MMHG | HEIGHT: 67 IN | WEIGHT: 162 LBS | HEART RATE: 79 BPM

## 2025-02-27 DIAGNOSIS — S76.311A STRAIN OF MUSCLE, FASCIA AND TENDON OF THE POSTERIOR MUSCLE GROUP AT THIGH LEVEL, RIGHT THIGH, INITIAL ENCOUNTER: ICD-10-CM

## 2025-02-27 PROCEDURE — 99213 OFFICE O/P EST LOW 20 MIN: CPT

## 2025-04-19 NOTE — H&P ADULT - PROBLEM SELECTOR PLAN 10
reports current mood as good  - c/w bupropion, venlafaxine 25% of the time/able to follow single-step instructions

## 2025-05-11 NOTE — ED ADULT TRIAGE NOTE - SOURCE OF INFORMATION
Pt presents to ed with c/o Asthma x 5-6 days. Pt states his asthma has not improved and he feels like he cannot breathe. Pt states he used his inhaler pta with no relief. Pt speaking in short sentences.     Wheezing noted.  Denies chest pain.    EMS

## 2025-07-24 ENCOUNTER — INPATIENT (INPATIENT)
Facility: HOSPITAL | Age: 70
LOS: 8 days | Discharge: NURSING, REHAB OR LONG-TERM CARE | DRG: 556 | End: 2025-08-02
Attending: STUDENT IN AN ORGANIZED HEALTH CARE EDUCATION/TRAINING PROGRAM | Admitting: HOSPITALIST
Payer: MEDICARE

## 2025-07-24 VITALS
DIASTOLIC BLOOD PRESSURE: 74 MMHG | WEIGHT: 164.91 LBS | OXYGEN SATURATION: 94 % | HEART RATE: 67 BPM | TEMPERATURE: 98 F | RESPIRATION RATE: 19 BRPM | SYSTOLIC BLOOD PRESSURE: 113 MMHG

## 2025-07-24 DIAGNOSIS — R29.898 OTHER SYMPTOMS AND SIGNS INVOLVING THE MUSCULOSKELETAL SYSTEM: ICD-10-CM

## 2025-07-24 DIAGNOSIS — F32.9 MAJOR DEPRESSIVE DISORDER, SINGLE EPISODE, UNSPECIFIED: ICD-10-CM

## 2025-07-24 DIAGNOSIS — N17.9 ACUTE KIDNEY FAILURE, UNSPECIFIED: ICD-10-CM

## 2025-07-24 DIAGNOSIS — E78.5 HYPERLIPIDEMIA, UNSPECIFIED: ICD-10-CM

## 2025-07-24 DIAGNOSIS — N40.0 BENIGN PROSTATIC HYPERPLASIA WITHOUT LOWER URINARY TRACT SYMPTOMS: ICD-10-CM

## 2025-07-24 DIAGNOSIS — E11.9 TYPE 2 DIABETES MELLITUS WITHOUT COMPLICATIONS: ICD-10-CM
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
## 2025-07-24 DIAGNOSIS — Z90.49 ACQUIRED ABSENCE OF OTHER SPECIFIED PARTS OF DIGESTIVE TRACT: Chronic | ICD-10-CM

## 2025-07-24 DIAGNOSIS — R53.81 OTHER MALAISE: ICD-10-CM

## 2025-07-24 DIAGNOSIS — R33.9 RETENTION OF URINE, UNSPECIFIED: ICD-10-CM

## 2025-07-24 DIAGNOSIS — N39.0 URINARY TRACT INFECTION, SITE NOT SPECIFIED: ICD-10-CM

## 2025-07-24 DIAGNOSIS — I10 ESSENTIAL (PRIMARY) HYPERTENSION: ICD-10-CM

## 2025-07-24 LAB
A1C WITH ESTIMATED AVERAGE GLUCOSE RESULT: 6.2 % — HIGH (ref 4–5.6)
ADD ON TEST-SPECIMEN IN LAB: SIGNIFICANT CHANGE UP
ALBUMIN SERPL ELPH-MCNC: 3.8 G/DL — SIGNIFICANT CHANGE UP (ref 3.3–5)
ALP SERPL-CCNC: 57 U/L — SIGNIFICANT CHANGE UP (ref 40–120)
ALT FLD-CCNC: 20 U/L — SIGNIFICANT CHANGE UP (ref 10–45)
ANION GAP SERPL CALC-SCNC: 12 MMOL/L — SIGNIFICANT CHANGE UP (ref 5–17)
ANION GAP SERPL CALC-SCNC: 13 MMOL/L — SIGNIFICANT CHANGE UP (ref 5–17)
APPEARANCE UR: ABNORMAL
AST SERPL-CCNC: 16 U/L — SIGNIFICANT CHANGE UP (ref 10–40)
BACTERIA # UR AUTO: NEGATIVE /HPF — SIGNIFICANT CHANGE UP
BASOPHILS # BLD AUTO: 0.06 K/UL — SIGNIFICANT CHANGE UP (ref 0–0.2)
BASOPHILS NFR BLD AUTO: 0.6 % — SIGNIFICANT CHANGE UP (ref 0–2)
BILIRUB SERPL-MCNC: 0.2 MG/DL — SIGNIFICANT CHANGE UP (ref 0.2–1.2)
BILIRUB UR-MCNC: ABNORMAL
BUN SERPL-MCNC: 27 MG/DL — HIGH (ref 7–23)
BUN SERPL-MCNC: 32 MG/DL — HIGH (ref 7–23)
CALCIUM SERPL-MCNC: 9.3 MG/DL — SIGNIFICANT CHANGE UP (ref 8.4–10.5)
CALCIUM SERPL-MCNC: 9.7 MG/DL — SIGNIFICANT CHANGE UP (ref 8.4–10.5)
CAST: 3 /LPF — SIGNIFICANT CHANGE UP (ref 0–4)
CHLORIDE SERPL-SCNC: 105 MMOL/L — SIGNIFICANT CHANGE UP (ref 96–108)
CHLORIDE SERPL-SCNC: 108 MMOL/L — SIGNIFICANT CHANGE UP (ref 96–108)
CK SERPL-CCNC: 61 U/L — SIGNIFICANT CHANGE UP (ref 30–200)
CO2 SERPL-SCNC: 22 MMOL/L — SIGNIFICANT CHANGE UP (ref 22–31)
CO2 SERPL-SCNC: 22 MMOL/L — SIGNIFICANT CHANGE UP (ref 22–31)
COLOR SPEC: ABNORMAL
CREAT SERPL-MCNC: 1.24 MG/DL — SIGNIFICANT CHANGE UP (ref 0.5–1.3)
CREAT SERPL-MCNC: 1.44 MG/DL — HIGH (ref 0.5–1.3)
DIFF PNL FLD: ABNORMAL
EGFR: 52 ML/MIN/1.73M2 — LOW
EGFR: 52 ML/MIN/1.73M2 — LOW
EGFR: 63 ML/MIN/1.73M2 — SIGNIFICANT CHANGE UP
EGFR: 63 ML/MIN/1.73M2 — SIGNIFICANT CHANGE UP
EOSINOPHIL # BLD AUTO: 0.2 K/UL — SIGNIFICANT CHANGE UP (ref 0–0.5)
EOSINOPHIL NFR BLD AUTO: 1.9 % — SIGNIFICANT CHANGE UP (ref 0–6)
ESTIMATED AVERAGE GLUCOSE: 131 MG/DL — HIGH (ref 68–114)
GLUCOSE BLDC GLUCOMTR-MCNC: 123 MG/DL — HIGH (ref 70–99)
GLUCOSE BLDC GLUCOMTR-MCNC: 129 MG/DL — HIGH (ref 70–99)
GLUCOSE BLDC GLUCOMTR-MCNC: 139 MG/DL — HIGH (ref 70–99)
GLUCOSE SERPL-MCNC: 106 MG/DL — HIGH (ref 70–99)
GLUCOSE SERPL-MCNC: 189 MG/DL — HIGH (ref 70–99)
GLUCOSE UR QL: NEGATIVE MG/DL — SIGNIFICANT CHANGE UP
HCT VFR BLD CALC: 36.5 % — LOW (ref 39–50)
HCT VFR BLD CALC: 37.6 % — LOW (ref 39–50)
HGB BLD-MCNC: 12 G/DL — LOW (ref 13–17)
HGB BLD-MCNC: 12.3 G/DL — LOW (ref 13–17)
IMM GRANULOCYTES # BLD AUTO: 0.12 K/UL — HIGH (ref 0–0.07)
IMM GRANULOCYTES NFR BLD AUTO: 1.1 % — HIGH (ref 0–0.9)
KETONES UR QL: NEGATIVE MG/DL — SIGNIFICANT CHANGE UP
LEUKOCYTE ESTERASE UR-ACNC: ABNORMAL
LYMPHOCYTES # BLD AUTO: 1.48 K/UL — SIGNIFICANT CHANGE UP (ref 1–3.3)
LYMPHOCYTES NFR BLD AUTO: 13.9 % — SIGNIFICANT CHANGE UP (ref 13–44)
MAGNESIUM SERPL-MCNC: 1.7 MG/DL — SIGNIFICANT CHANGE UP (ref 1.6–2.6)
MCHC RBC-ENTMCNC: 32.1 PG — SIGNIFICANT CHANGE UP (ref 27–34)
MCHC RBC-ENTMCNC: 32.1 PG — SIGNIFICANT CHANGE UP (ref 27–34)
MCHC RBC-ENTMCNC: 32.7 G/DL — SIGNIFICANT CHANGE UP (ref 32–36)
MCHC RBC-ENTMCNC: 32.9 G/DL — SIGNIFICANT CHANGE UP (ref 32–36)
MCV RBC AUTO: 97.6 FL — SIGNIFICANT CHANGE UP (ref 80–100)
MCV RBC AUTO: 98.2 FL — SIGNIFICANT CHANGE UP (ref 80–100)
MONOCYTES # BLD AUTO: 1.01 K/UL — HIGH (ref 0–0.9)
MONOCYTES NFR BLD AUTO: 9.5 % — SIGNIFICANT CHANGE UP (ref 2–14)
NEUTROPHILS # BLD AUTO: 7.78 K/UL — HIGH (ref 1.8–7.4)
NEUTROPHILS NFR BLD AUTO: 73 % — SIGNIFICANT CHANGE UP (ref 43–77)
NITRITE UR-MCNC: POSITIVE
NRBC # BLD AUTO: 0 K/UL — SIGNIFICANT CHANGE UP (ref 0–0)
NRBC # BLD AUTO: 0 K/UL — SIGNIFICANT CHANGE UP (ref 0–0)
NRBC # FLD: 0 K/UL — SIGNIFICANT CHANGE UP (ref 0–0)
NRBC # FLD: 0 K/UL — SIGNIFICANT CHANGE UP (ref 0–0)
NRBC BLD AUTO-RTO: 0 /100 WBCS — SIGNIFICANT CHANGE UP (ref 0–0)
NRBC BLD AUTO-RTO: 0 /100 WBCS — SIGNIFICANT CHANGE UP (ref 0–0)
PH UR: 5 — SIGNIFICANT CHANGE UP (ref 5–8)
PHOSPHATE SERPL-MCNC: 2.5 MG/DL — SIGNIFICANT CHANGE UP (ref 2.5–4.5)
PLATELET # BLD AUTO: 306 K/UL — SIGNIFICANT CHANGE UP (ref 150–400)
PLATELET # BLD AUTO: 337 K/UL — SIGNIFICANT CHANGE UP (ref 150–400)
PMV BLD: 9.5 FL — SIGNIFICANT CHANGE UP (ref 7–13)
PMV BLD: 9.7 FL — SIGNIFICANT CHANGE UP (ref 7–13)
POTASSIUM SERPL-MCNC: 3.9 MMOL/L — SIGNIFICANT CHANGE UP (ref 3.5–5.3)
POTASSIUM SERPL-MCNC: 4.1 MMOL/L — SIGNIFICANT CHANGE UP (ref 3.5–5.3)
POTASSIUM SERPL-SCNC: 3.9 MMOL/L — SIGNIFICANT CHANGE UP (ref 3.5–5.3)
POTASSIUM SERPL-SCNC: 4.1 MMOL/L — SIGNIFICANT CHANGE UP (ref 3.5–5.3)
PROT SERPL-MCNC: 7.1 G/DL — SIGNIFICANT CHANGE UP (ref 6–8.3)
PROT UR-MCNC: 30 MG/DL
RBC # BLD: 3.74 M/UL — LOW (ref 4.2–5.8)
RBC # BLD: 3.83 M/UL — LOW (ref 4.2–5.8)
RBC # FLD: 13.6 % — SIGNIFICANT CHANGE UP (ref 10.3–14.5)
RBC # FLD: 13.6 % — SIGNIFICANT CHANGE UP (ref 10.3–14.5)
RBC CASTS # UR COMP ASSIST: 25 /HPF — HIGH (ref 0–4)
REVIEW: SIGNIFICANT CHANGE UP
SODIUM SERPL-SCNC: 139 MMOL/L — SIGNIFICANT CHANGE UP (ref 135–145)
SODIUM SERPL-SCNC: 143 MMOL/L — SIGNIFICANT CHANGE UP (ref 135–145)
SP GR SPEC: 1.02 — SIGNIFICANT CHANGE UP (ref 1–1.03)
SQUAMOUS # UR AUTO: 1 /HPF — SIGNIFICANT CHANGE UP (ref 0–5)
UROBILINOGEN FLD QL: 1 MG/DL — SIGNIFICANT CHANGE UP (ref 0.2–1)
WBC # BLD: 10.65 K/UL — HIGH (ref 3.8–10.5)
WBC # BLD: 9.49 K/UL — SIGNIFICANT CHANGE UP (ref 3.8–10.5)
WBC # FLD AUTO: 10.65 K/UL — HIGH (ref 3.8–10.5)
WBC # FLD AUTO: 9.49 K/UL — SIGNIFICANT CHANGE UP (ref 3.8–10.5)
WBC UR QL: 245 /HPF — HIGH (ref 0–5)

## 2025-07-24 PROCEDURE — 85025 COMPLETE CBC W/AUTO DIFF WBC: CPT

## 2025-07-24 PROCEDURE — 80053 COMPREHEN METABOLIC PANEL: CPT

## 2025-07-24 PROCEDURE — 81001 URINALYSIS AUTO W/SCOPE: CPT

## 2025-07-24 PROCEDURE — 97161 PT EVAL LOW COMPLEX 20 MIN: CPT

## 2025-07-24 PROCEDURE — 80048 BASIC METABOLIC PNL TOTAL CA: CPT

## 2025-07-24 PROCEDURE — 82550 ASSAY OF CK (CPK): CPT

## 2025-07-24 PROCEDURE — 83036 HEMOGLOBIN GLYCOSYLATED A1C: CPT

## 2025-07-24 PROCEDURE — 84100 ASSAY OF PHOSPHORUS: CPT

## 2025-07-24 PROCEDURE — 85027 COMPLETE CBC AUTOMATED: CPT

## 2025-07-24 PROCEDURE — 83735 ASSAY OF MAGNESIUM: CPT

## 2025-07-24 PROCEDURE — 99223 1ST HOSP IP/OBS HIGH 75: CPT

## 2025-07-24 PROCEDURE — 87086 URINE CULTURE/COLONY COUNT: CPT

## 2025-07-24 PROCEDURE — 82962 GLUCOSE BLOOD TEST: CPT

## 2025-07-24 PROCEDURE — 99285 EMERGENCY DEPT VISIT HI MDM: CPT | Mod: GC

## 2025-07-24 RX ORDER — ACETAMINOPHEN 500 MG/5ML
650 LIQUID (ML) ORAL EVERY 6 HOURS
Refills: 0 | Status: DISCONTINUED | OUTPATIENT
Start: 2025-07-24 | End: 2025-07-30

## 2025-07-24 RX ORDER — CEFTRIAXONE 500 MG/1
1000 INJECTION, POWDER, FOR SOLUTION INTRAMUSCULAR; INTRAVENOUS EVERY 24 HOURS
Refills: 0 | Status: COMPLETED | OUTPATIENT
Start: 2025-07-24 | End: 2025-07-27

## 2025-07-24 RX ORDER — DEXTROSE 50 % IN WATER 50 %
12.5 SYRINGE (ML) INTRAVENOUS ONCE
Refills: 0 | Status: DISCONTINUED | OUTPATIENT
Start: 2025-07-24 | End: 2025-07-30

## 2025-07-24 RX ORDER — METOPROLOL SUCCINATE 50 MG/1
100 TABLET, EXTENDED RELEASE ORAL DAILY
Refills: 0 | Status: DISCONTINUED | OUTPATIENT
Start: 2025-07-24 | End: 2025-07-30

## 2025-07-24 RX ORDER — FINASTERIDE 1 MG/1
5 TABLET, FILM COATED ORAL DAILY
Refills: 0 | Status: DISCONTINUED | OUTPATIENT
Start: 2025-07-24 | End: 2025-07-30

## 2025-07-24 RX ORDER — SODIUM CHLORIDE 9 G/1000ML
1000 INJECTION, SOLUTION INTRAVENOUS
Refills: 0 | Status: DISCONTINUED | OUTPATIENT
Start: 2025-07-24 | End: 2025-07-30

## 2025-07-24 RX ORDER — BUPROPION HYDROBROMIDE 522 MG/1
300 TABLET, EXTENDED RELEASE ORAL DAILY
Refills: 0 | Status: DISCONTINUED | OUTPATIENT
Start: 2025-07-24 | End: 2025-07-30

## 2025-07-24 RX ORDER — VENLAFAXINE HYDROCHLORIDE 37.5 MG/1
150 CAPSULE, EXTENDED RELEASE ORAL DAILY
Refills: 0 | Status: DISCONTINUED | OUTPATIENT
Start: 2025-07-24 | End: 2025-07-30

## 2025-07-24 RX ORDER — MELATONIN 5 MG
3 TABLET ORAL AT BEDTIME
Refills: 0 | Status: DISCONTINUED | OUTPATIENT
Start: 2025-07-24 | End: 2025-07-30

## 2025-07-24 RX ORDER — INSULIN LISPRO 100 U/ML
INJECTION, SOLUTION INTRAVENOUS; SUBCUTANEOUS
Refills: 0 | Status: DISCONTINUED | OUTPATIENT
Start: 2025-07-24 | End: 2025-07-30

## 2025-07-24 RX ORDER — CEFTRIAXONE 500 MG/1
1000 INJECTION, POWDER, FOR SOLUTION INTRAMUSCULAR; INTRAVENOUS ONCE
Refills: 0 | Status: COMPLETED | OUTPATIENT
Start: 2025-07-24 | End: 2025-07-24

## 2025-07-24 RX ORDER — DEXTROSE 50 % IN WATER 50 %
25 SYRINGE (ML) INTRAVENOUS ONCE
Refills: 0 | Status: DISCONTINUED | OUTPATIENT
Start: 2025-07-24 | End: 2025-07-30

## 2025-07-24 RX ORDER — DEXTROSE 50 % IN WATER 50 %
15 SYRINGE (ML) INTRAVENOUS ONCE
Refills: 0 | Status: DISCONTINUED | OUTPATIENT
Start: 2025-07-24 | End: 2025-07-30

## 2025-07-24 RX ORDER — POLYETHYLENE GLYCOL 3350 17 G/17G
17 POWDER, FOR SOLUTION ORAL DAILY
Refills: 0 | Status: DISCONTINUED | OUTPATIENT
Start: 2025-07-24 | End: 2025-07-30

## 2025-07-24 RX ORDER — ATORVASTATIN CALCIUM 80 MG/1
10 TABLET, FILM COATED ORAL AT BEDTIME
Refills: 0 | Status: DISCONTINUED | OUTPATIENT
Start: 2025-07-24 | End: 2025-07-30

## 2025-07-24 RX ORDER — GLUCAGON 3 MG/1
1 POWDER NASAL ONCE
Refills: 0 | Status: DISCONTINUED | OUTPATIENT
Start: 2025-07-24 | End: 2025-07-30

## 2025-07-24 RX ORDER — TAMSULOSIN HYDROCHLORIDE 0.4 MG/1
0.4 CAPSULE ORAL AT BEDTIME
Refills: 0 | Status: DISCONTINUED | OUTPATIENT
Start: 2025-07-24 | End: 2025-07-28

## 2025-07-24 RX ORDER — SODIUM CHLORIDE 9 G/1000ML
1000 INJECTION, SOLUTION INTRAVENOUS
Refills: 0 | Status: DISCONTINUED | OUTPATIENT
Start: 2025-07-24 | End: 2025-07-28

## 2025-07-24 RX ADMIN — Medication 1 PATCH: at 10:52

## 2025-07-24 RX ADMIN — POLYETHYLENE GLYCOL 3350 17 GRAM(S): 17 POWDER, FOR SOLUTION ORAL at 10:52

## 2025-07-24 RX ADMIN — CEFTRIAXONE 100 MILLIGRAM(S): 500 INJECTION, POWDER, FOR SOLUTION INTRAMUSCULAR; INTRAVENOUS at 03:30

## 2025-07-24 RX ADMIN — Medication 1000 MILLILITER(S): at 03:30

## 2025-07-24 RX ADMIN — VENLAFAXINE HYDROCHLORIDE 150 MILLIGRAM(S): 37.5 CAPSULE, EXTENDED RELEASE ORAL at 10:52

## 2025-07-24 RX ADMIN — ATORVASTATIN CALCIUM 10 MILLIGRAM(S): 80 TABLET, FILM COATED ORAL at 21:48

## 2025-07-24 RX ADMIN — Medication 1 PATCH: at 20:48

## 2025-07-24 RX ADMIN — SODIUM CHLORIDE 100 MILLILITER(S): 9 INJECTION, SOLUTION INTRAVENOUS at 07:01

## 2025-07-24 RX ADMIN — FINASTERIDE 5 MILLIGRAM(S): 1 TABLET, FILM COATED ORAL at 10:52

## 2025-07-24 RX ADMIN — TAMSULOSIN HYDROCHLORIDE 0.4 MILLIGRAM(S): 0.4 CAPSULE ORAL at 21:48

## 2025-07-24 RX ADMIN — BUPROPION HYDROBROMIDE 300 MILLIGRAM(S): 522 TABLET, EXTENDED RELEASE ORAL at 10:52

## 2025-07-25 LAB
A1C WITH ESTIMATED AVERAGE GLUCOSE RESULT: 6.3 % — HIGH (ref 4–5.6)
ANION GAP SERPL CALC-SCNC: 16 MMOL/L — SIGNIFICANT CHANGE UP (ref 5–17)
BUN SERPL-MCNC: 18 MG/DL — SIGNIFICANT CHANGE UP (ref 7–23)
CALCIUM SERPL-MCNC: 9.2 MG/DL — SIGNIFICANT CHANGE UP (ref 8.4–10.5)
CHLORIDE SERPL-SCNC: 105 MMOL/L — SIGNIFICANT CHANGE UP (ref 96–108)
CO2 SERPL-SCNC: 20 MMOL/L — LOW (ref 22–31)
CREAT SERPL-MCNC: 1.07 MG/DL — SIGNIFICANT CHANGE UP (ref 0.5–1.3)
EGFR: 75 ML/MIN/1.73M2 — SIGNIFICANT CHANGE UP
EGFR: 75 ML/MIN/1.73M2 — SIGNIFICANT CHANGE UP
ESTIMATED AVERAGE GLUCOSE: 134 MG/DL — HIGH (ref 68–114)
GLUCOSE BLDC GLUCOMTR-MCNC: 109 MG/DL — HIGH (ref 70–99)
GLUCOSE BLDC GLUCOMTR-MCNC: 121 MG/DL — HIGH (ref 70–99)
GLUCOSE BLDC GLUCOMTR-MCNC: 158 MG/DL — HIGH (ref 70–99)
GLUCOSE BLDC GLUCOMTR-MCNC: 169 MG/DL — HIGH (ref 70–99)
GLUCOSE SERPL-MCNC: 117 MG/DL — HIGH (ref 70–99)
HCT VFR BLD CALC: 34.6 % — LOW (ref 39–50)
HGB BLD-MCNC: 11.4 G/DL — LOW (ref 13–17)
MCHC RBC-ENTMCNC: 32 PG — SIGNIFICANT CHANGE UP (ref 27–34)
MCHC RBC-ENTMCNC: 32.9 G/DL — SIGNIFICANT CHANGE UP (ref 32–36)
MCV RBC AUTO: 97.2 FL — SIGNIFICANT CHANGE UP (ref 80–100)
NRBC # BLD AUTO: 0 K/UL — SIGNIFICANT CHANGE UP (ref 0–0)
NRBC # FLD: 0 K/UL — SIGNIFICANT CHANGE UP (ref 0–0)
NRBC BLD AUTO-RTO: 0 /100 WBCS — SIGNIFICANT CHANGE UP (ref 0–0)
PLATELET # BLD AUTO: 274 K/UL — SIGNIFICANT CHANGE UP (ref 150–400)
PMV BLD: 9.8 FL — SIGNIFICANT CHANGE UP (ref 7–13)
POTASSIUM SERPL-MCNC: 3.9 MMOL/L — SIGNIFICANT CHANGE UP (ref 3.5–5.3)
POTASSIUM SERPL-SCNC: 3.9 MMOL/L — SIGNIFICANT CHANGE UP (ref 3.5–5.3)
RBC # BLD: 3.56 M/UL — LOW (ref 4.2–5.8)
RBC # FLD: 13.5 % — SIGNIFICANT CHANGE UP (ref 10.3–14.5)
SODIUM SERPL-SCNC: 141 MMOL/L — SIGNIFICANT CHANGE UP (ref 135–145)
WBC # BLD: 8.52 K/UL — SIGNIFICANT CHANGE UP (ref 3.8–10.5)
WBC # FLD AUTO: 8.52 K/UL — SIGNIFICANT CHANGE UP (ref 3.8–10.5)

## 2025-07-25 PROCEDURE — 99233 SBSQ HOSP IP/OBS HIGH 50: CPT

## 2025-07-25 RX ADMIN — FINASTERIDE 5 MILLIGRAM(S): 1 TABLET, FILM COATED ORAL at 10:49

## 2025-07-25 RX ADMIN — TAMSULOSIN HYDROCHLORIDE 0.4 MILLIGRAM(S): 0.4 CAPSULE ORAL at 21:21

## 2025-07-25 RX ADMIN — SODIUM CHLORIDE 100 MILLILITER(S): 9 INJECTION, SOLUTION INTRAVENOUS at 17:09

## 2025-07-25 RX ADMIN — POLYETHYLENE GLYCOL 3350 17 GRAM(S): 17 POWDER, FOR SOLUTION ORAL at 10:50

## 2025-07-25 RX ADMIN — Medication 1 PATCH: at 19:45

## 2025-07-25 RX ADMIN — VENLAFAXINE HYDROCHLORIDE 150 MILLIGRAM(S): 37.5 CAPSULE, EXTENDED RELEASE ORAL at 10:49

## 2025-07-25 RX ADMIN — Medication 1 PATCH: at 07:12

## 2025-07-25 RX ADMIN — SODIUM CHLORIDE 100 MILLILITER(S): 9 INJECTION, SOLUTION INTRAVENOUS at 05:17

## 2025-07-25 RX ADMIN — ATORVASTATIN CALCIUM 10 MILLIGRAM(S): 80 TABLET, FILM COATED ORAL at 21:21

## 2025-07-25 RX ADMIN — BUPROPION HYDROBROMIDE 300 MILLIGRAM(S): 522 TABLET, EXTENDED RELEASE ORAL at 10:50

## 2025-07-25 RX ADMIN — INSULIN LISPRO 2: 100 INJECTION, SOLUTION INTRAVENOUS; SUBCUTANEOUS at 12:40

## 2025-07-25 RX ADMIN — CEFTRIAXONE 100 MILLIGRAM(S): 500 INJECTION, POWDER, FOR SOLUTION INTRAMUSCULAR; INTRAVENOUS at 02:57

## 2025-07-26 LAB
-  DAPTOMYCIN: SIGNIFICANT CHANGE UP
-  GENTAMICIN: SIGNIFICANT CHANGE UP
-  LINEZOLID: SIGNIFICANT CHANGE UP
-  NITROFURANTOIN: SIGNIFICANT CHANGE UP
-  OXACILLIN: SIGNIFICANT CHANGE UP
-  PENICILLIN: SIGNIFICANT CHANGE UP
-  RIFAMPIN: SIGNIFICANT CHANGE UP
-  TETRACYCLINE: SIGNIFICANT CHANGE UP
-  TRIMETHOPRIM/SULFAMETHOXAZOLE: SIGNIFICANT CHANGE UP
-  VANCOMYCIN: SIGNIFICANT CHANGE UP
CULTURE RESULTS: ABNORMAL
GLUCOSE BLDC GLUCOMTR-MCNC: 111 MG/DL — HIGH (ref 70–99)
GLUCOSE BLDC GLUCOMTR-MCNC: 125 MG/DL — HIGH (ref 70–99)
GLUCOSE BLDC GLUCOMTR-MCNC: 135 MG/DL — HIGH (ref 70–99)
GLUCOSE BLDC GLUCOMTR-MCNC: 176 MG/DL — HIGH (ref 70–99)
METHOD TYPE: SIGNIFICANT CHANGE UP
ORGANISM # SPEC MICROSCOPIC CNT: ABNORMAL
ORGANISM # SPEC MICROSCOPIC CNT: ABNORMAL
SPECIMEN SOURCE: SIGNIFICANT CHANGE UP

## 2025-07-26 PROCEDURE — 99233 SBSQ HOSP IP/OBS HIGH 50: CPT

## 2025-07-26 PROCEDURE — 72141 MRI NECK SPINE W/O DYE: CPT | Mod: 26

## 2025-07-26 RX ADMIN — POLYETHYLENE GLYCOL 3350 17 GRAM(S): 17 POWDER, FOR SOLUTION ORAL at 11:52

## 2025-07-26 RX ADMIN — Medication 1 PATCH: at 19:55

## 2025-07-26 RX ADMIN — BUPROPION HYDROBROMIDE 300 MILLIGRAM(S): 522 TABLET, EXTENDED RELEASE ORAL at 11:52

## 2025-07-26 RX ADMIN — VENLAFAXINE HYDROCHLORIDE 150 MILLIGRAM(S): 37.5 CAPSULE, EXTENDED RELEASE ORAL at 11:52

## 2025-07-26 RX ADMIN — SODIUM CHLORIDE 100 MILLILITER(S): 9 INJECTION, SOLUTION INTRAVENOUS at 12:36

## 2025-07-26 RX ADMIN — CEFTRIAXONE 100 MILLIGRAM(S): 500 INJECTION, POWDER, FOR SOLUTION INTRAMUSCULAR; INTRAVENOUS at 03:01

## 2025-07-26 RX ADMIN — SODIUM CHLORIDE 100 MILLILITER(S): 9 INJECTION, SOLUTION INTRAVENOUS at 22:05

## 2025-07-26 RX ADMIN — ATORVASTATIN CALCIUM 10 MILLIGRAM(S): 80 TABLET, FILM COATED ORAL at 22:01

## 2025-07-26 RX ADMIN — INSULIN LISPRO 2: 100 INJECTION, SOLUTION INTRAVENOUS; SUBCUTANEOUS at 12:52

## 2025-07-26 RX ADMIN — FINASTERIDE 5 MILLIGRAM(S): 1 TABLET, FILM COATED ORAL at 11:52

## 2025-07-26 RX ADMIN — TAMSULOSIN HYDROCHLORIDE 0.4 MILLIGRAM(S): 0.4 CAPSULE ORAL at 22:01

## 2025-07-26 RX ADMIN — Medication 1 PATCH: at 08:43

## 2025-07-27 LAB
ANION GAP SERPL CALC-SCNC: 14 MMOL/L — SIGNIFICANT CHANGE UP (ref 5–17)
BUN SERPL-MCNC: 15 MG/DL — SIGNIFICANT CHANGE UP (ref 7–23)
CALCIUM SERPL-MCNC: 9.2 MG/DL — SIGNIFICANT CHANGE UP (ref 8.4–10.5)
CHLORIDE SERPL-SCNC: 104 MMOL/L — SIGNIFICANT CHANGE UP (ref 96–108)
CO2 SERPL-SCNC: 25 MMOL/L — SIGNIFICANT CHANGE UP (ref 22–31)
CREAT SERPL-MCNC: 1.02 MG/DL — SIGNIFICANT CHANGE UP (ref 0.5–1.3)
EGFR: 79 ML/MIN/1.73M2 — SIGNIFICANT CHANGE UP
EGFR: 79 ML/MIN/1.73M2 — SIGNIFICANT CHANGE UP
GLUCOSE BLDC GLUCOMTR-MCNC: 124 MG/DL — HIGH (ref 70–99)
GLUCOSE BLDC GLUCOMTR-MCNC: 127 MG/DL — HIGH (ref 70–99)
GLUCOSE BLDC GLUCOMTR-MCNC: 136 MG/DL — HIGH (ref 70–99)
GLUCOSE BLDC GLUCOMTR-MCNC: 162 MG/DL — HIGH (ref 70–99)
GLUCOSE SERPL-MCNC: 128 MG/DL — HIGH (ref 70–99)
HCT VFR BLD CALC: 36.8 % — LOW (ref 39–50)
HGB BLD-MCNC: 12.1 G/DL — LOW (ref 13–17)
MAGNESIUM SERPL-MCNC: 1.7 MG/DL — SIGNIFICANT CHANGE UP (ref 1.6–2.6)
MCHC RBC-ENTMCNC: 31.8 PG — SIGNIFICANT CHANGE UP (ref 27–34)
MCHC RBC-ENTMCNC: 32.9 G/DL — SIGNIFICANT CHANGE UP (ref 32–36)
MCV RBC AUTO: 96.6 FL — SIGNIFICANT CHANGE UP (ref 80–100)
NRBC # BLD AUTO: 0 K/UL — SIGNIFICANT CHANGE UP (ref 0–0)
NRBC # FLD: 0 K/UL — SIGNIFICANT CHANGE UP (ref 0–0)
NRBC BLD AUTO-RTO: 0 /100 WBCS — SIGNIFICANT CHANGE UP (ref 0–0)
PLATELET # BLD AUTO: 257 K/UL — SIGNIFICANT CHANGE UP (ref 150–400)
PMV BLD: 9.7 FL — SIGNIFICANT CHANGE UP (ref 7–13)
POTASSIUM SERPL-MCNC: 3.8 MMOL/L — SIGNIFICANT CHANGE UP (ref 3.5–5.3)
POTASSIUM SERPL-SCNC: 3.8 MMOL/L — SIGNIFICANT CHANGE UP (ref 3.5–5.3)
RBC # BLD: 3.81 M/UL — LOW (ref 4.2–5.8)
RBC # FLD: 13.6 % — SIGNIFICANT CHANGE UP (ref 10.3–14.5)
SODIUM SERPL-SCNC: 143 MMOL/L — SIGNIFICANT CHANGE UP (ref 135–145)
WBC # BLD: 8.93 K/UL — SIGNIFICANT CHANGE UP (ref 3.8–10.5)
WBC # FLD AUTO: 8.93 K/UL — SIGNIFICANT CHANGE UP (ref 3.8–10.5)

## 2025-07-27 PROCEDURE — 99233 SBSQ HOSP IP/OBS HIGH 50: CPT

## 2025-07-27 RX ORDER — VANCOMYCIN HCL IN 5 % DEXTROSE 1.5G/250ML
1000 PLASTIC BAG, INJECTION (ML) INTRAVENOUS EVERY 12 HOURS
Refills: 0 | Status: DISCONTINUED | OUTPATIENT
Start: 2025-07-28 | End: 2025-07-28

## 2025-07-27 RX ORDER — VANCOMYCIN HCL IN 5 % DEXTROSE 1.5G/250ML
PLASTIC BAG, INJECTION (ML) INTRAVENOUS
Refills: 0 | Status: DISCONTINUED | OUTPATIENT
Start: 2025-07-27 | End: 2025-07-28

## 2025-07-27 RX ORDER — VANCOMYCIN HCL IN 5 % DEXTROSE 1.5G/250ML
1000 PLASTIC BAG, INJECTION (ML) INTRAVENOUS ONCE
Refills: 0 | Status: COMPLETED | OUTPATIENT
Start: 2025-07-27 | End: 2025-07-27

## 2025-07-27 RX ADMIN — SODIUM CHLORIDE 100 MILLILITER(S): 9 INJECTION, SOLUTION INTRAVENOUS at 18:39

## 2025-07-27 RX ADMIN — BUPROPION HYDROBROMIDE 300 MILLIGRAM(S): 522 TABLET, EXTENDED RELEASE ORAL at 12:27

## 2025-07-27 RX ADMIN — FINASTERIDE 5 MILLIGRAM(S): 1 TABLET, FILM COATED ORAL at 12:27

## 2025-07-27 RX ADMIN — POLYETHYLENE GLYCOL 3350 17 GRAM(S): 17 POWDER, FOR SOLUTION ORAL at 12:27

## 2025-07-27 RX ADMIN — Medication 1 PATCH: at 19:50

## 2025-07-27 RX ADMIN — VENLAFAXINE HYDROCHLORIDE 150 MILLIGRAM(S): 37.5 CAPSULE, EXTENDED RELEASE ORAL at 12:27

## 2025-07-27 RX ADMIN — Medication 250 MILLIGRAM(S): at 14:37

## 2025-07-27 RX ADMIN — METOPROLOL SUCCINATE 100 MILLIGRAM(S): 50 TABLET, EXTENDED RELEASE ORAL at 05:08

## 2025-07-27 RX ADMIN — INSULIN LISPRO 2: 100 INJECTION, SOLUTION INTRAVENOUS; SUBCUTANEOUS at 17:45

## 2025-07-27 RX ADMIN — Medication 1 PATCH: at 07:52

## 2025-07-27 RX ADMIN — CEFTRIAXONE 100 MILLIGRAM(S): 500 INJECTION, POWDER, FOR SOLUTION INTRAMUSCULAR; INTRAVENOUS at 02:06

## 2025-07-27 RX ADMIN — TAMSULOSIN HYDROCHLORIDE 0.4 MILLIGRAM(S): 0.4 CAPSULE ORAL at 21:31

## 2025-07-27 RX ADMIN — ATORVASTATIN CALCIUM 10 MILLIGRAM(S): 80 TABLET, FILM COATED ORAL at 21:31

## 2025-07-28 DIAGNOSIS — Z01.818 ENCOUNTER FOR OTHER PREPROCEDURAL EXAMINATION: ICD-10-CM

## 2025-07-28 DIAGNOSIS — Z29.9 ENCOUNTER FOR PROPHYLACTIC MEASURES, UNSPECIFIED: ICD-10-CM

## 2025-07-28 LAB
ANION GAP SERPL CALC-SCNC: 13 MMOL/L — SIGNIFICANT CHANGE UP (ref 5–17)
APTT BLD: 28.8 SEC — SIGNIFICANT CHANGE UP (ref 26.1–36.8)
BLD GP AB SCN SERPL QL: NEGATIVE — SIGNIFICANT CHANGE UP
BUN SERPL-MCNC: 22 MG/DL — SIGNIFICANT CHANGE UP (ref 7–23)
CALCIUM SERPL-MCNC: 9.1 MG/DL — SIGNIFICANT CHANGE UP (ref 8.4–10.5)
CHLORIDE SERPL-SCNC: 103 MMOL/L — SIGNIFICANT CHANGE UP (ref 96–108)
CO2 SERPL-SCNC: 26 MMOL/L — SIGNIFICANT CHANGE UP (ref 22–31)
CREAT SERPL-MCNC: 1 MG/DL — SIGNIFICANT CHANGE UP (ref 0.5–1.3)
EGFR: 81 ML/MIN/1.73M2 — SIGNIFICANT CHANGE UP
EGFR: 81 ML/MIN/1.73M2 — SIGNIFICANT CHANGE UP
GLUCOSE BLDC GLUCOMTR-MCNC: 118 MG/DL — HIGH (ref 70–99)
GLUCOSE BLDC GLUCOMTR-MCNC: 119 MG/DL — HIGH (ref 70–99)
GLUCOSE BLDC GLUCOMTR-MCNC: 125 MG/DL — HIGH (ref 70–99)
GLUCOSE BLDC GLUCOMTR-MCNC: 127 MG/DL — HIGH (ref 70–99)
GLUCOSE SERPL-MCNC: 125 MG/DL — HIGH (ref 70–99)
HCT VFR BLD CALC: 35 % — LOW (ref 39–50)
HGB BLD-MCNC: 11.8 G/DL — LOW (ref 13–17)
INR BLD: 1.2 RATIO — HIGH (ref 0.85–1.16)
MAGNESIUM SERPL-MCNC: 1.6 MG/DL — SIGNIFICANT CHANGE UP (ref 1.6–2.6)
MCHC RBC-ENTMCNC: 32.3 PG — SIGNIFICANT CHANGE UP (ref 27–34)
MCHC RBC-ENTMCNC: 33.7 G/DL — SIGNIFICANT CHANGE UP (ref 32–36)
MCV RBC AUTO: 95.9 FL — SIGNIFICANT CHANGE UP (ref 80–100)
NRBC # BLD AUTO: 0 K/UL — SIGNIFICANT CHANGE UP (ref 0–0)
NRBC # FLD: 0 K/UL — SIGNIFICANT CHANGE UP (ref 0–0)
NRBC BLD AUTO-RTO: 0 /100 WBCS — SIGNIFICANT CHANGE UP (ref 0–0)
PLATELET # BLD AUTO: 265 K/UL — SIGNIFICANT CHANGE UP (ref 150–400)
PMV BLD: 9.9 FL — SIGNIFICANT CHANGE UP (ref 7–13)
POTASSIUM SERPL-MCNC: 3.7 MMOL/L — SIGNIFICANT CHANGE UP (ref 3.5–5.3)
POTASSIUM SERPL-SCNC: 3.7 MMOL/L — SIGNIFICANT CHANGE UP (ref 3.5–5.3)
PROTHROM AB SERPL-ACNC: 13.7 SEC — HIGH (ref 9.9–13.4)
RBC # BLD: 3.65 M/UL — LOW (ref 4.2–5.8)
RBC # FLD: 13.6 % — SIGNIFICANT CHANGE UP (ref 10.3–14.5)
RH IG SCN BLD-IMP: POSITIVE — SIGNIFICANT CHANGE UP
RH IG SCN BLD-IMP: POSITIVE — SIGNIFICANT CHANGE UP
SODIUM SERPL-SCNC: 142 MMOL/L — SIGNIFICANT CHANGE UP (ref 135–145)
WBC # BLD: 10.33 K/UL — SIGNIFICANT CHANGE UP (ref 3.8–10.5)
WBC # FLD AUTO: 10.33 K/UL — SIGNIFICANT CHANGE UP (ref 3.8–10.5)

## 2025-07-28 PROCEDURE — 87077 CULTURE AEROBIC IDENTIFY: CPT

## 2025-07-28 PROCEDURE — 85730 THROMBOPLASTIN TIME PARTIAL: CPT

## 2025-07-28 PROCEDURE — 82962 GLUCOSE BLOOD TEST: CPT

## 2025-07-28 PROCEDURE — 87641 MR-STAPH DNA AMP PROBE: CPT

## 2025-07-28 PROCEDURE — 97116 GAIT TRAINING THERAPY: CPT

## 2025-07-28 PROCEDURE — 87086 URINE CULTURE/COLONY COUNT: CPT

## 2025-07-28 PROCEDURE — 87186 SC STD MICRODIL/AGAR DIL: CPT

## 2025-07-28 PROCEDURE — 87640 STAPH A DNA AMP PROBE: CPT

## 2025-07-28 PROCEDURE — 86901 BLOOD TYPING SEROLOGIC RH(D): CPT

## 2025-07-28 PROCEDURE — 93970 EXTREMITY STUDY: CPT | Mod: 26

## 2025-07-28 PROCEDURE — 72141 MRI NECK SPINE W/O DYE: CPT

## 2025-07-28 PROCEDURE — 93970 EXTREMITY STUDY: CPT

## 2025-07-28 PROCEDURE — 80048 BASIC METABOLIC PNL TOTAL CA: CPT

## 2025-07-28 PROCEDURE — 97161 PT EVAL LOW COMPLEX 20 MIN: CPT

## 2025-07-28 PROCEDURE — 80053 COMPREHEN METABOLIC PANEL: CPT

## 2025-07-28 PROCEDURE — 97530 THERAPEUTIC ACTIVITIES: CPT

## 2025-07-28 PROCEDURE — 85025 COMPLETE CBC W/AUTO DIFF WBC: CPT

## 2025-07-28 PROCEDURE — 86850 RBC ANTIBODY SCREEN: CPT

## 2025-07-28 PROCEDURE — 87040 BLOOD CULTURE FOR BACTERIA: CPT

## 2025-07-28 PROCEDURE — 85610 PROTHROMBIN TIME: CPT

## 2025-07-28 PROCEDURE — 82550 ASSAY OF CK (CPK): CPT

## 2025-07-28 PROCEDURE — 83036 HEMOGLOBIN GLYCOSYLATED A1C: CPT

## 2025-07-28 PROCEDURE — 36415 COLL VENOUS BLD VENIPUNCTURE: CPT

## 2025-07-28 PROCEDURE — 85027 COMPLETE CBC AUTOMATED: CPT

## 2025-07-28 PROCEDURE — 84100 ASSAY OF PHOSPHORUS: CPT

## 2025-07-28 PROCEDURE — 86900 BLOOD TYPING SEROLOGIC ABO: CPT

## 2025-07-28 PROCEDURE — 83735 ASSAY OF MAGNESIUM: CPT

## 2025-07-28 PROCEDURE — 81001 URINALYSIS AUTO W/SCOPE: CPT

## 2025-07-28 PROCEDURE — 99233 SBSQ HOSP IP/OBS HIGH 50: CPT

## 2025-07-28 RX ORDER — VANCOMYCIN HCL IN 5 % DEXTROSE 1.5G/250ML
1000 PLASTIC BAG, INJECTION (ML) INTRAVENOUS EVERY 12 HOURS
Refills: 0 | Status: DISCONTINUED | OUTPATIENT
Start: 2025-07-28 | End: 2025-07-30

## 2025-07-28 RX ORDER — SULFAMETHOXAZOLE/TRIMETHOPRIM 800-160 MG
1 TABLET ORAL EVERY 12 HOURS
Refills: 0 | Status: DISCONTINUED | OUTPATIENT
Start: 2025-07-28 | End: 2025-07-28

## 2025-07-28 RX ORDER — TAMSULOSIN HYDROCHLORIDE 0.4 MG/1
0.8 CAPSULE ORAL AT BEDTIME
Refills: 0 | Status: DISCONTINUED | OUTPATIENT
Start: 2025-07-28 | End: 2025-07-30

## 2025-07-28 RX ADMIN — Medication 650 MILLIGRAM(S): at 17:21

## 2025-07-28 RX ADMIN — TAMSULOSIN HYDROCHLORIDE 0.8 MILLIGRAM(S): 0.4 CAPSULE ORAL at 21:43

## 2025-07-28 RX ADMIN — POLYETHYLENE GLYCOL 3350 17 GRAM(S): 17 POWDER, FOR SOLUTION ORAL at 12:57

## 2025-07-28 RX ADMIN — FINASTERIDE 5 MILLIGRAM(S): 1 TABLET, FILM COATED ORAL at 12:57

## 2025-07-28 RX ADMIN — VENLAFAXINE HYDROCHLORIDE 150 MILLIGRAM(S): 37.5 CAPSULE, EXTENDED RELEASE ORAL at 12:57

## 2025-07-28 RX ADMIN — Medication 250 MILLIGRAM(S): at 17:12

## 2025-07-28 RX ADMIN — Medication 1 PATCH: at 07:50

## 2025-07-28 RX ADMIN — Medication 1 PATCH: at 19:21

## 2025-07-28 RX ADMIN — ATORVASTATIN CALCIUM 10 MILLIGRAM(S): 80 TABLET, FILM COATED ORAL at 21:42

## 2025-07-28 RX ADMIN — Medication 250 MILLIGRAM(S): at 05:58

## 2025-07-28 RX ADMIN — Medication 650 MILLIGRAM(S): at 16:22

## 2025-07-28 RX ADMIN — BUPROPION HYDROBROMIDE 300 MILLIGRAM(S): 522 TABLET, EXTENDED RELEASE ORAL at 12:57

## 2025-07-29 PROBLEM — N40.0 BENIGN PROSTATIC HYPERPLASIA WITHOUT LOWER URINARY TRACT SYMPTOMS: Chronic | Status: ACTIVE | Noted: 2025-07-24

## 2025-07-29 LAB
ALBUMIN SERPL ELPH-MCNC: 3.7 G/DL — SIGNIFICANT CHANGE UP (ref 3.3–5)
ALP SERPL-CCNC: 59 U/L — SIGNIFICANT CHANGE UP (ref 40–120)
ALT FLD-CCNC: 23 U/L — SIGNIFICANT CHANGE UP (ref 10–45)
ANION GAP SERPL CALC-SCNC: 12 MMOL/L — SIGNIFICANT CHANGE UP (ref 5–17)
APTT BLD: 29.2 SEC — SIGNIFICANT CHANGE UP (ref 26.1–36.8)
APTT BLD: 31.9 SEC — SIGNIFICANT CHANGE UP (ref 26.1–36.8)
AST SERPL-CCNC: 21 U/L — SIGNIFICANT CHANGE UP (ref 10–40)
BILIRUB SERPL-MCNC: 0.5 MG/DL — SIGNIFICANT CHANGE UP (ref 0.2–1.2)
BLD GP AB SCN SERPL QL: NEGATIVE — SIGNIFICANT CHANGE UP
BUN SERPL-MCNC: 18 MG/DL — SIGNIFICANT CHANGE UP (ref 7–23)
CALCIUM SERPL-MCNC: 9.3 MG/DL — SIGNIFICANT CHANGE UP (ref 8.4–10.5)
CHLORIDE SERPL-SCNC: 101 MMOL/L — SIGNIFICANT CHANGE UP (ref 96–108)
CO2 SERPL-SCNC: 27 MMOL/L — SIGNIFICANT CHANGE UP (ref 22–31)
CREAT SERPL-MCNC: 0.95 MG/DL — SIGNIFICANT CHANGE UP (ref 0.5–1.3)
EGFR: 86 ML/MIN/1.73M2 — SIGNIFICANT CHANGE UP
EGFR: 86 ML/MIN/1.73M2 — SIGNIFICANT CHANGE UP
GLUCOSE BLDC GLUCOMTR-MCNC: 131 MG/DL — HIGH (ref 70–99)
GLUCOSE BLDC GLUCOMTR-MCNC: 136 MG/DL — HIGH (ref 70–99)
GLUCOSE BLDC GLUCOMTR-MCNC: 138 MG/DL — HIGH (ref 70–99)
GLUCOSE BLDC GLUCOMTR-MCNC: 200 MG/DL — HIGH (ref 70–99)
GLUCOSE SERPL-MCNC: 145 MG/DL — HIGH (ref 70–99)
HCT VFR BLD CALC: 38.2 % — LOW (ref 39–50)
HGB BLD-MCNC: 12.5 G/DL — LOW (ref 13–17)
INR BLD: 1.11 RATIO — SIGNIFICANT CHANGE UP (ref 0.85–1.16)
INR BLD: 1.12 RATIO — SIGNIFICANT CHANGE UP (ref 0.85–1.16)
MAGNESIUM SERPL-MCNC: 1.8 MG/DL — SIGNIFICANT CHANGE UP (ref 1.6–2.6)
MCHC RBC-ENTMCNC: 31.7 PG — SIGNIFICANT CHANGE UP (ref 27–34)
MCHC RBC-ENTMCNC: 32.7 G/DL — SIGNIFICANT CHANGE UP (ref 32–36)
MCV RBC AUTO: 97 FL — SIGNIFICANT CHANGE UP (ref 80–100)
MRSA PCR RESULT.: DETECTED
MRSA PCR RESULT.: DETECTED
NRBC # BLD AUTO: 0 K/UL — SIGNIFICANT CHANGE UP (ref 0–0)
NRBC # FLD: 0 K/UL — SIGNIFICANT CHANGE UP (ref 0–0)
NRBC BLD AUTO-RTO: 0 /100 WBCS — SIGNIFICANT CHANGE UP (ref 0–0)
PHOSPHATE SERPL-MCNC: 3.1 MG/DL — SIGNIFICANT CHANGE UP (ref 2.5–4.5)
PLATELET # BLD AUTO: 255 K/UL — SIGNIFICANT CHANGE UP (ref 150–400)
PMV BLD: 9.6 FL — SIGNIFICANT CHANGE UP (ref 7–13)
POTASSIUM SERPL-MCNC: 3.7 MMOL/L — SIGNIFICANT CHANGE UP (ref 3.5–5.3)
POTASSIUM SERPL-SCNC: 3.7 MMOL/L — SIGNIFICANT CHANGE UP (ref 3.5–5.3)
PROT SERPL-MCNC: 6.6 G/DL — SIGNIFICANT CHANGE UP (ref 6–8.3)
PROTHROM AB SERPL-ACNC: 12.7 SEC — SIGNIFICANT CHANGE UP (ref 9.9–13.4)
PROTHROM AB SERPL-ACNC: 12.7 SEC — SIGNIFICANT CHANGE UP (ref 9.9–13.4)
RBC # BLD: 3.94 M/UL — LOW (ref 4.2–5.8)
RBC # FLD: 13.8 % — SIGNIFICANT CHANGE UP (ref 10.3–14.5)
RH IG SCN BLD-IMP: POSITIVE — SIGNIFICANT CHANGE UP
S AUREUS DNA NOSE QL NAA+PROBE: DETECTED
S AUREUS DNA NOSE QL NAA+PROBE: DETECTED
SODIUM SERPL-SCNC: 140 MMOL/L — SIGNIFICANT CHANGE UP (ref 135–145)
VANCOMYCIN TROUGH SERPL-MCNC: 13.8 UG/ML — SIGNIFICANT CHANGE UP (ref 10–20)
WBC # BLD: 9.79 K/UL — SIGNIFICANT CHANGE UP (ref 3.8–10.5)
WBC # FLD AUTO: 9.79 K/UL — SIGNIFICANT CHANGE UP (ref 3.8–10.5)

## 2025-07-29 PROCEDURE — 72125 CT NECK SPINE W/O DYE: CPT | Mod: 26

## 2025-07-29 PROCEDURE — 99233 SBSQ HOSP IP/OBS HIGH 50: CPT

## 2025-07-29 PROCEDURE — 93010 ELECTROCARDIOGRAM REPORT: CPT

## 2025-07-29 RX ORDER — MUPIROCIN CALCIUM 20 MG/G
1 CREAM TOPICAL
Refills: 0 | Status: DISCONTINUED | OUTPATIENT
Start: 2025-07-29 | End: 2025-07-30

## 2025-07-29 RX ADMIN — MUPIROCIN CALCIUM 1 APPLICATION(S): 20 CREAM TOPICAL at 18:11

## 2025-07-29 RX ADMIN — METOPROLOL SUCCINATE 100 MILLIGRAM(S): 50 TABLET, EXTENDED RELEASE ORAL at 06:02

## 2025-07-29 RX ADMIN — VENLAFAXINE HYDROCHLORIDE 150 MILLIGRAM(S): 37.5 CAPSULE, EXTENDED RELEASE ORAL at 12:56

## 2025-07-29 RX ADMIN — TAMSULOSIN HYDROCHLORIDE 0.8 MILLIGRAM(S): 0.4 CAPSULE ORAL at 21:46

## 2025-07-29 RX ADMIN — Medication 1 PATCH: at 06:59

## 2025-07-29 RX ADMIN — Medication 250 MILLIGRAM(S): at 06:02

## 2025-07-29 RX ADMIN — BUPROPION HYDROBROMIDE 300 MILLIGRAM(S): 522 TABLET, EXTENDED RELEASE ORAL at 12:55

## 2025-07-29 RX ADMIN — Medication 1 PATCH: at 20:00

## 2025-07-29 RX ADMIN — Medication 300 MILLIGRAM(S): at 12:55

## 2025-07-29 RX ADMIN — INSULIN LISPRO 2: 100 INJECTION, SOLUTION INTRAVENOUS; SUBCUTANEOUS at 12:53

## 2025-07-29 RX ADMIN — Medication 250 MILLIGRAM(S): at 18:22

## 2025-07-29 RX ADMIN — FINASTERIDE 5 MILLIGRAM(S): 1 TABLET, FILM COATED ORAL at 12:54

## 2025-07-29 RX ADMIN — Medication 1 APPLICATION(S): at 21:45

## 2025-07-29 RX ADMIN — ATORVASTATIN CALCIUM 10 MILLIGRAM(S): 80 TABLET, FILM COATED ORAL at 21:46

## 2025-07-30 ENCOUNTER — APPOINTMENT (OUTPATIENT)
Dept: NEUROSURGERY | Facility: HOSPITAL | Age: 70
End: 2025-07-30

## 2025-07-30 ENCOUNTER — TRANSCRIPTION ENCOUNTER (OUTPATIENT)
Age: 70
End: 2025-07-30

## 2025-07-30 LAB
ALBUMIN SERPL ELPH-MCNC: 3.4 G/DL — SIGNIFICANT CHANGE UP (ref 3.3–5)
ALP SERPL-CCNC: 57 U/L — SIGNIFICANT CHANGE UP (ref 40–120)
ALT FLD-CCNC: 23 U/L — SIGNIFICANT CHANGE UP (ref 10–45)
ANION GAP SERPL CALC-SCNC: 11 MMOL/L — SIGNIFICANT CHANGE UP (ref 5–17)
APTT BLD: 28.9 SEC — SIGNIFICANT CHANGE UP (ref 26.1–36.8)
AST SERPL-CCNC: 19 U/L — SIGNIFICANT CHANGE UP (ref 10–40)
BILIRUB SERPL-MCNC: 0.4 MG/DL — SIGNIFICANT CHANGE UP (ref 0.2–1.2)
BUN SERPL-MCNC: 24 MG/DL — HIGH (ref 7–23)
CALCIUM SERPL-MCNC: 9.1 MG/DL — SIGNIFICANT CHANGE UP (ref 8.4–10.5)
CHLORIDE SERPL-SCNC: 104 MMOL/L — SIGNIFICANT CHANGE UP (ref 96–108)
CO2 SERPL-SCNC: 27 MMOL/L — SIGNIFICANT CHANGE UP (ref 22–31)
CREAT SERPL-MCNC: 0.98 MG/DL — SIGNIFICANT CHANGE UP (ref 0.5–1.3)
EGFR: 83 ML/MIN/1.73M2 — SIGNIFICANT CHANGE UP
EGFR: 83 ML/MIN/1.73M2 — SIGNIFICANT CHANGE UP
GLUCOSE BLDC GLUCOMTR-MCNC: 108 MG/DL — HIGH (ref 70–99)
GLUCOSE BLDC GLUCOMTR-MCNC: 138 MG/DL — HIGH (ref 70–99)
GLUCOSE BLDC GLUCOMTR-MCNC: 140 MG/DL — HIGH (ref 70–99)
GLUCOSE BLDC GLUCOMTR-MCNC: 266 MG/DL — HIGH (ref 70–99)
GLUCOSE SERPL-MCNC: 142 MG/DL — HIGH (ref 70–99)
HCT VFR BLD CALC: 35.2 % — LOW (ref 39–50)
HGB BLD-MCNC: 11.6 G/DL — LOW (ref 13–17)
MAGNESIUM SERPL-MCNC: 1.8 MG/DL — SIGNIFICANT CHANGE UP (ref 1.6–2.6)
MCHC RBC-ENTMCNC: 32 PG — SIGNIFICANT CHANGE UP (ref 27–34)
MCHC RBC-ENTMCNC: 33 G/DL — SIGNIFICANT CHANGE UP (ref 32–36)
MCV RBC AUTO: 97 FL — SIGNIFICANT CHANGE UP (ref 80–100)
NRBC # BLD AUTO: 0 K/UL — SIGNIFICANT CHANGE UP (ref 0–0)
NRBC # FLD: 0 K/UL — SIGNIFICANT CHANGE UP (ref 0–0)
NRBC BLD AUTO-RTO: 0 /100 WBCS — SIGNIFICANT CHANGE UP (ref 0–0)
PHOSPHATE SERPL-MCNC: 3.3 MG/DL — SIGNIFICANT CHANGE UP (ref 2.5–4.5)
PLATELET # BLD AUTO: 260 K/UL — SIGNIFICANT CHANGE UP (ref 150–400)
PMV BLD: 9.9 FL — SIGNIFICANT CHANGE UP (ref 7–13)
POTASSIUM SERPL-MCNC: 3.6 MMOL/L — SIGNIFICANT CHANGE UP (ref 3.5–5.3)
POTASSIUM SERPL-SCNC: 3.6 MMOL/L — SIGNIFICANT CHANGE UP (ref 3.5–5.3)
PROT SERPL-MCNC: 6.2 G/DL — SIGNIFICANT CHANGE UP (ref 6–8.3)
RBC # BLD: 3.63 M/UL — LOW (ref 4.2–5.8)
RBC # FLD: 13.9 % — SIGNIFICANT CHANGE UP (ref 10.3–14.5)
SODIUM SERPL-SCNC: 142 MMOL/L — SIGNIFICANT CHANGE UP (ref 135–145)
WBC # BLD: 9.88 K/UL — SIGNIFICANT CHANGE UP (ref 3.8–10.5)
WBC # FLD AUTO: 9.88 K/UL — SIGNIFICANT CHANGE UP (ref 3.8–10.5)

## 2025-07-30 PROCEDURE — 83036 HEMOGLOBIN GLYCOSYLATED A1C: CPT

## 2025-07-30 PROCEDURE — 85610 PROTHROMBIN TIME: CPT

## 2025-07-30 PROCEDURE — 85027 COMPLETE CBC AUTOMATED: CPT

## 2025-07-30 PROCEDURE — 84100 ASSAY OF PHOSPHORUS: CPT

## 2025-07-30 PROCEDURE — 86901 BLOOD TYPING SEROLOGIC RH(D): CPT

## 2025-07-30 PROCEDURE — 80048 BASIC METABOLIC PNL TOTAL CA: CPT

## 2025-07-30 PROCEDURE — 87040 BLOOD CULTURE FOR BACTERIA: CPT

## 2025-07-30 PROCEDURE — 86850 RBC ANTIBODY SCREEN: CPT

## 2025-07-30 PROCEDURE — 97530 THERAPEUTIC ACTIVITIES: CPT

## 2025-07-30 PROCEDURE — 87077 CULTURE AEROBIC IDENTIFY: CPT

## 2025-07-30 PROCEDURE — 22853 INSJ BIOMECHANICAL DEVICE: CPT

## 2025-07-30 PROCEDURE — 36415 COLL VENOUS BLD VENIPUNCTURE: CPT

## 2025-07-30 PROCEDURE — 97161 PT EVAL LOW COMPLEX 20 MIN: CPT

## 2025-07-30 PROCEDURE — 82962 GLUCOSE BLOOD TEST: CPT

## 2025-07-30 PROCEDURE — 72125 CT NECK SPINE W/O DYE: CPT

## 2025-07-30 PROCEDURE — 82550 ASSAY OF CK (CPK): CPT

## 2025-07-30 PROCEDURE — 81001 URINALYSIS AUTO W/SCOPE: CPT

## 2025-07-30 PROCEDURE — 72141 MRI NECK SPINE W/O DYE: CPT

## 2025-07-30 PROCEDURE — 80053 COMPREHEN METABOLIC PANEL: CPT

## 2025-07-30 PROCEDURE — 87086 URINE CULTURE/COLONY COUNT: CPT

## 2025-07-30 PROCEDURE — 99232 SBSQ HOSP IP/OBS MODERATE 35: CPT

## 2025-07-30 PROCEDURE — 22845 INSERT SPINE FIXATION DEVICE: CPT | Mod: 59

## 2025-07-30 PROCEDURE — 76000 FLUOROSCOPY <1 HR PHYS/QHP: CPT

## 2025-07-30 PROCEDURE — 80202 ASSAY OF VANCOMYCIN: CPT

## 2025-07-30 PROCEDURE — 97116 GAIT TRAINING THERAPY: CPT

## 2025-07-30 PROCEDURE — 22551 ARTHRD ANT NTRBDY CERVICAL: CPT

## 2025-07-30 PROCEDURE — 87186 SC STD MICRODIL/AGAR DIL: CPT

## 2025-07-30 PROCEDURE — 87640 STAPH A DNA AMP PROBE: CPT

## 2025-07-30 PROCEDURE — 93970 EXTREMITY STUDY: CPT

## 2025-07-30 PROCEDURE — 86900 BLOOD TYPING SEROLOGIC ABO: CPT

## 2025-07-30 PROCEDURE — 87641 MR-STAPH DNA AMP PROBE: CPT

## 2025-07-30 PROCEDURE — 85025 COMPLETE CBC W/AUTO DIFF WBC: CPT

## 2025-07-30 PROCEDURE — 83735 ASSAY OF MAGNESIUM: CPT

## 2025-07-30 PROCEDURE — 85730 THROMBOPLASTIN TIME PARTIAL: CPT

## 2025-07-30 DEVICE — DISTRACTION PIN 14MM (YELLOW): Type: IMPLANTABLE DEVICE | Status: FUNCTIONAL

## 2025-07-30 DEVICE — FLOSEAL WITH RECOTHROM THROMBIN 10ML: Type: IMPLANTABLE DEVICE | Status: FUNCTIONAL

## 2025-07-30 DEVICE — LIGATING CLIPS WECK HORIZON MEDIUM (BLUE) 24: Type: IMPLANTABLE DEVICE | Status: FUNCTIONAL

## 2025-07-30 DEVICE — CAGE EIT CIF 8 DEG 6MM SM: Type: IMPLANTABLE DEVICE | Status: FUNCTIONAL

## 2025-07-30 DEVICE — IMPLANTABLE DEVICE: Type: IMPLANTABLE DEVICE | Status: FUNCTIONAL

## 2025-07-30 DEVICE — SURGIFOAM 8 X 12.5CM X 10MM (100): Type: IMPLANTABLE DEVICE | Status: FUNCTIONAL

## 2025-07-30 RX ORDER — OXYCODONE HYDROCHLORIDE 30 MG/1
5 TABLET ORAL EVERY 6 HOURS
Refills: 0 | Status: DISCONTINUED | OUTPATIENT
Start: 2025-07-30 | End: 2025-08-02

## 2025-07-30 RX ORDER — CEFAZOLIN SODIUM IN 0.9 % NACL 3 G/100 ML
2000 INTRAVENOUS SOLUTION, PIGGYBACK (ML) INTRAVENOUS EVERY 8 HOURS
Refills: 0 | Status: COMPLETED | OUTPATIENT
Start: 2025-07-30 | End: 2025-07-31

## 2025-07-30 RX ORDER — FINASTERIDE 1 MG/1
5 TABLET, FILM COATED ORAL DAILY
Refills: 0 | Status: DISCONTINUED | OUTPATIENT
Start: 2025-07-30 | End: 2025-08-02

## 2025-07-30 RX ORDER — SODIUM CHLORIDE 9 G/1000ML
1000 INJECTION, SOLUTION INTRAVENOUS
Refills: 0 | Status: DISCONTINUED | OUTPATIENT
Start: 2025-07-30 | End: 2025-08-02

## 2025-07-30 RX ORDER — DEXTROSE 50 % IN WATER 50 %
25 SYRINGE (ML) INTRAVENOUS ONCE
Refills: 0 | Status: DISCONTINUED | OUTPATIENT
Start: 2025-07-30 | End: 2025-08-02

## 2025-07-30 RX ORDER — DEXTROSE 50 % IN WATER 50 %
12.5 SYRINGE (ML) INTRAVENOUS ONCE
Refills: 0 | Status: DISCONTINUED | OUTPATIENT
Start: 2025-07-30 | End: 2025-08-02

## 2025-07-30 RX ORDER — TAMSULOSIN HYDROCHLORIDE 0.4 MG/1
0.8 CAPSULE ORAL AT BEDTIME
Refills: 0 | Status: DISCONTINUED | OUTPATIENT
Start: 2025-07-30 | End: 2025-08-02

## 2025-07-30 RX ORDER — ACETAMINOPHEN 500 MG/5ML
650 LIQUID (ML) ORAL EVERY 6 HOURS
Refills: 0 | Status: DISCONTINUED | OUTPATIENT
Start: 2025-07-30 | End: 2025-08-02

## 2025-07-30 RX ORDER — GLUCAGON 3 MG/1
1 POWDER NASAL ONCE
Refills: 0 | Status: DISCONTINUED | OUTPATIENT
Start: 2025-07-30 | End: 2025-08-02

## 2025-07-30 RX ORDER — FENTANYL CITRATE-0.9 % NACL/PF 100MCG/2ML
25 SYRINGE (ML) INTRAVENOUS
Refills: 0 | Status: DISCONTINUED | OUTPATIENT
Start: 2025-07-30 | End: 2025-07-30

## 2025-07-30 RX ORDER — VANCOMYCIN HCL IN 5 % DEXTROSE 1.5G/250ML
1000 PLASTIC BAG, INJECTION (ML) INTRAVENOUS ONCE
Refills: 0 | Status: COMPLETED | OUTPATIENT
Start: 2025-07-30 | End: 2025-07-30

## 2025-07-30 RX ORDER — HYDROMORPHONE/SOD CHLOR,ISO/PF 2 MG/10 ML
0.5 SYRINGE (ML) INJECTION
Refills: 0 | Status: DISCONTINUED | OUTPATIENT
Start: 2025-07-30 | End: 2025-07-30

## 2025-07-30 RX ORDER — ONDANSETRON HCL/PF 4 MG/2 ML
4 VIAL (ML) INJECTION ONCE
Refills: 0 | Status: DISCONTINUED | OUTPATIENT
Start: 2025-07-30 | End: 2025-07-30

## 2025-07-30 RX ORDER — POLYETHYLENE GLYCOL 3350 17 G/17G
17 POWDER, FOR SOLUTION ORAL DAILY
Refills: 0 | Status: DISCONTINUED | OUTPATIENT
Start: 2025-07-30 | End: 2025-08-02

## 2025-07-30 RX ORDER — ATORVASTATIN CALCIUM 80 MG/1
10 TABLET, FILM COATED ORAL AT BEDTIME
Refills: 0 | Status: DISCONTINUED | OUTPATIENT
Start: 2025-07-30 | End: 2025-08-02

## 2025-07-30 RX ORDER — MELATONIN 5 MG
3 TABLET ORAL AT BEDTIME
Refills: 0 | Status: DISCONTINUED | OUTPATIENT
Start: 2025-07-30 | End: 2025-08-02

## 2025-07-30 RX ORDER — VENLAFAXINE HYDROCHLORIDE 37.5 MG/1
150 CAPSULE, EXTENDED RELEASE ORAL DAILY
Refills: 0 | Status: DISCONTINUED | OUTPATIENT
Start: 2025-07-30 | End: 2025-08-02

## 2025-07-30 RX ORDER — MUPIROCIN CALCIUM 20 MG/G
1 CREAM TOPICAL
Refills: 0 | Status: DISCONTINUED | OUTPATIENT
Start: 2025-07-30 | End: 2025-08-02

## 2025-07-30 RX ORDER — INSULIN LISPRO 100 U/ML
INJECTION, SOLUTION INTRAVENOUS; SUBCUTANEOUS
Refills: 0 | Status: DISCONTINUED | OUTPATIENT
Start: 2025-07-30 | End: 2025-08-02

## 2025-07-30 RX ORDER — BUPROPION HYDROBROMIDE 522 MG/1
300 TABLET, EXTENDED RELEASE ORAL DAILY
Refills: 0 | Status: DISCONTINUED | OUTPATIENT
Start: 2025-07-30 | End: 2025-08-02

## 2025-07-30 RX ORDER — METOPROLOL SUCCINATE 50 MG/1
100 TABLET, EXTENDED RELEASE ORAL DAILY
Refills: 0 | Status: DISCONTINUED | OUTPATIENT
Start: 2025-07-30 | End: 2025-08-02

## 2025-07-30 RX ORDER — ENOXAPARIN SODIUM 100 MG/ML
40 INJECTION SUBCUTANEOUS EVERY 24 HOURS
Refills: 0 | Status: DISCONTINUED | OUTPATIENT
Start: 2025-07-31 | End: 2025-08-02

## 2025-07-30 RX ORDER — DEXTROSE 50 % IN WATER 50 %
15 SYRINGE (ML) INTRAVENOUS ONCE
Refills: 0 | Status: DISCONTINUED | OUTPATIENT
Start: 2025-07-30 | End: 2025-08-02

## 2025-07-30 RX ADMIN — Medication 100 MILLIGRAM(S): at 21:01

## 2025-07-30 RX ADMIN — Medication 75 MILLILITER(S): at 16:42

## 2025-07-30 RX ADMIN — ATORVASTATIN CALCIUM 10 MILLIGRAM(S): 80 TABLET, FILM COATED ORAL at 21:01

## 2025-07-30 RX ADMIN — FINASTERIDE 5 MILLIGRAM(S): 1 TABLET, FILM COATED ORAL at 17:05

## 2025-07-30 RX ADMIN — Medication 1 PATCH: at 20:30

## 2025-07-30 RX ADMIN — Medication 1 PATCH: at 17:12

## 2025-07-30 RX ADMIN — TAMSULOSIN HYDROCHLORIDE 0.8 MILLIGRAM(S): 0.4 CAPSULE ORAL at 21:01

## 2025-07-30 RX ADMIN — Medication 250 MILLIGRAM(S): at 05:32

## 2025-07-30 RX ADMIN — Medication 1 PATCH: at 07:00

## 2025-07-30 RX ADMIN — METOPROLOL SUCCINATE 100 MILLIGRAM(S): 50 TABLET, EXTENDED RELEASE ORAL at 05:31

## 2025-07-30 RX ADMIN — MUPIROCIN CALCIUM 1 APPLICATION(S): 20 CREAM TOPICAL at 05:32

## 2025-07-30 RX ADMIN — Medication 250 MILLIGRAM(S): at 16:42

## 2025-07-31 ENCOUNTER — TRANSCRIPTION ENCOUNTER (OUTPATIENT)
Age: 70
End: 2025-07-31

## 2025-07-31 DIAGNOSIS — G95.20 UNSPECIFIED CORD COMPRESSION: ICD-10-CM

## 2025-07-31 LAB
ANION GAP SERPL CALC-SCNC: 12 MMOL/L — SIGNIFICANT CHANGE UP (ref 5–17)
BUN SERPL-MCNC: 19 MG/DL — SIGNIFICANT CHANGE UP (ref 7–23)
CALCIUM SERPL-MCNC: 8.7 MG/DL — SIGNIFICANT CHANGE UP (ref 8.4–10.5)
CHLORIDE SERPL-SCNC: 104 MMOL/L — SIGNIFICANT CHANGE UP (ref 96–108)
CO2 SERPL-SCNC: 23 MMOL/L — SIGNIFICANT CHANGE UP (ref 22–31)
CREAT SERPL-MCNC: 0.89 MG/DL — SIGNIFICANT CHANGE UP (ref 0.5–1.3)
CULTURE RESULTS: SIGNIFICANT CHANGE UP
CULTURE RESULTS: SIGNIFICANT CHANGE UP
EGFR: 92 ML/MIN/1.73M2 — SIGNIFICANT CHANGE UP
EGFR: 92 ML/MIN/1.73M2 — SIGNIFICANT CHANGE UP
GLUCOSE BLDC GLUCOMTR-MCNC: 119 MG/DL — HIGH (ref 70–99)
GLUCOSE BLDC GLUCOMTR-MCNC: 137 MG/DL — HIGH (ref 70–99)
GLUCOSE BLDC GLUCOMTR-MCNC: 139 MG/DL — HIGH (ref 70–99)
GLUCOSE BLDC GLUCOMTR-MCNC: 168 MG/DL — HIGH (ref 70–99)
GLUCOSE SERPL-MCNC: 118 MG/DL — HIGH (ref 70–99)
HCT VFR BLD CALC: 36 % — LOW (ref 39–50)
HGB BLD-MCNC: 11.9 G/DL — LOW (ref 13–17)
MAGNESIUM SERPL-MCNC: 1.8 MG/DL — SIGNIFICANT CHANGE UP (ref 1.6–2.6)
MCHC RBC-ENTMCNC: 32.1 PG — SIGNIFICANT CHANGE UP (ref 27–34)
MCHC RBC-ENTMCNC: 33.1 G/DL — SIGNIFICANT CHANGE UP (ref 32–36)
MCV RBC AUTO: 97 FL — SIGNIFICANT CHANGE UP (ref 80–100)
NRBC # BLD AUTO: 0 K/UL — SIGNIFICANT CHANGE UP (ref 0–0)
NRBC # FLD: 0 K/UL — SIGNIFICANT CHANGE UP (ref 0–0)
NRBC BLD AUTO-RTO: 0 /100 WBCS — SIGNIFICANT CHANGE UP (ref 0–0)
PLATELET # BLD AUTO: 257 K/UL — SIGNIFICANT CHANGE UP (ref 150–400)
PMV BLD: 10 FL — SIGNIFICANT CHANGE UP (ref 7–13)
POTASSIUM SERPL-MCNC: 3.5 MMOL/L — SIGNIFICANT CHANGE UP (ref 3.5–5.3)
POTASSIUM SERPL-SCNC: 3.5 MMOL/L — SIGNIFICANT CHANGE UP (ref 3.5–5.3)
RBC # BLD: 3.71 M/UL — LOW (ref 4.2–5.8)
RBC # FLD: 13.9 % — SIGNIFICANT CHANGE UP (ref 10.3–14.5)
SODIUM SERPL-SCNC: 139 MMOL/L — SIGNIFICANT CHANGE UP (ref 135–145)
SPECIMEN SOURCE: SIGNIFICANT CHANGE UP
SPECIMEN SOURCE: SIGNIFICANT CHANGE UP
WBC # BLD: 13.7 K/UL — HIGH (ref 3.8–10.5)
WBC # FLD AUTO: 13.7 K/UL — HIGH (ref 3.8–10.5)

## 2025-07-31 PROCEDURE — 99233 SBSQ HOSP IP/OBS HIGH 50: CPT

## 2025-07-31 PROCEDURE — 72040 X-RAY EXAM NECK SPINE 2-3 VW: CPT | Mod: 26

## 2025-07-31 RX ADMIN — METOPROLOL SUCCINATE 100 MILLIGRAM(S): 50 TABLET, EXTENDED RELEASE ORAL at 05:47

## 2025-07-31 RX ADMIN — VENLAFAXINE HYDROCHLORIDE 150 MILLIGRAM(S): 37.5 CAPSULE, EXTENDED RELEASE ORAL at 11:48

## 2025-07-31 RX ADMIN — Medication 100 MILLIGRAM(S): at 05:48

## 2025-07-31 RX ADMIN — BUPROPION HYDROBROMIDE 300 MILLIGRAM(S): 522 TABLET, EXTENDED RELEASE ORAL at 11:47

## 2025-07-31 RX ADMIN — ENOXAPARIN SODIUM 40 MILLIGRAM(S): 100 INJECTION SUBCUTANEOUS at 21:25

## 2025-07-31 RX ADMIN — ATORVASTATIN CALCIUM 10 MILLIGRAM(S): 80 TABLET, FILM COATED ORAL at 21:15

## 2025-07-31 RX ADMIN — Medication 650 MILLIGRAM(S): at 21:14

## 2025-07-31 RX ADMIN — Medication 1 PATCH: at 19:00

## 2025-07-31 RX ADMIN — Medication 300 MILLIGRAM(S): at 11:46

## 2025-07-31 RX ADMIN — MUPIROCIN CALCIUM 1 APPLICATION(S): 20 CREAM TOPICAL at 05:47

## 2025-07-31 RX ADMIN — MUPIROCIN CALCIUM 1 APPLICATION(S): 20 CREAM TOPICAL at 17:48

## 2025-07-31 RX ADMIN — Medication 650 MILLIGRAM(S): at 21:44

## 2025-07-31 RX ADMIN — FINASTERIDE 5 MILLIGRAM(S): 1 TABLET, FILM COATED ORAL at 11:46

## 2025-07-31 RX ADMIN — TAMSULOSIN HYDROCHLORIDE 0.8 MILLIGRAM(S): 0.4 CAPSULE ORAL at 21:25

## 2025-08-01 LAB
ANION GAP SERPL CALC-SCNC: 10 MMOL/L — SIGNIFICANT CHANGE UP (ref 5–17)
BUN SERPL-MCNC: 20 MG/DL — SIGNIFICANT CHANGE UP (ref 7–23)
CALCIUM SERPL-MCNC: 8.8 MG/DL — SIGNIFICANT CHANGE UP (ref 8.4–10.5)
CHLORIDE SERPL-SCNC: 105 MMOL/L — SIGNIFICANT CHANGE UP (ref 96–108)
CO2 SERPL-SCNC: 25 MMOL/L — SIGNIFICANT CHANGE UP (ref 22–31)
CREAT SERPL-MCNC: 0.97 MG/DL — SIGNIFICANT CHANGE UP (ref 0.5–1.3)
EGFR: 84 ML/MIN/1.73M2 — SIGNIFICANT CHANGE UP
EGFR: 84 ML/MIN/1.73M2 — SIGNIFICANT CHANGE UP
GLUCOSE BLDC GLUCOMTR-MCNC: 130 MG/DL — HIGH (ref 70–99)
GLUCOSE BLDC GLUCOMTR-MCNC: 155 MG/DL — HIGH (ref 70–99)
GLUCOSE BLDC GLUCOMTR-MCNC: 217 MG/DL — HIGH (ref 70–99)
GLUCOSE BLDC GLUCOMTR-MCNC: 94 MG/DL — SIGNIFICANT CHANGE UP (ref 70–99)
GLUCOSE SERPL-MCNC: 140 MG/DL — HIGH (ref 70–99)
HCT VFR BLD CALC: 34.9 % — LOW (ref 39–50)
HGB BLD-MCNC: 11.4 G/DL — LOW (ref 13–17)
MAGNESIUM SERPL-MCNC: 1.9 MG/DL — SIGNIFICANT CHANGE UP (ref 1.6–2.6)
MCHC RBC-ENTMCNC: 31.7 PG — SIGNIFICANT CHANGE UP (ref 27–34)
MCHC RBC-ENTMCNC: 32.7 G/DL — SIGNIFICANT CHANGE UP (ref 32–36)
MCV RBC AUTO: 96.9 FL — SIGNIFICANT CHANGE UP (ref 80–100)
NRBC # BLD AUTO: 0 K/UL — SIGNIFICANT CHANGE UP (ref 0–0)
NRBC # FLD: 0 K/UL — SIGNIFICANT CHANGE UP (ref 0–0)
NRBC BLD AUTO-RTO: 0 /100 WBCS — SIGNIFICANT CHANGE UP (ref 0–0)
PLATELET # BLD AUTO: 258 K/UL — SIGNIFICANT CHANGE UP (ref 150–400)
PMV BLD: 10.1 FL — SIGNIFICANT CHANGE UP (ref 7–13)
POTASSIUM SERPL-MCNC: 3.6 MMOL/L — SIGNIFICANT CHANGE UP (ref 3.5–5.3)
POTASSIUM SERPL-SCNC: 3.6 MMOL/L — SIGNIFICANT CHANGE UP (ref 3.5–5.3)
RBC # BLD: 3.6 M/UL — LOW (ref 4.2–5.8)
RBC # FLD: 14.1 % — SIGNIFICANT CHANGE UP (ref 10.3–14.5)
SODIUM SERPL-SCNC: 140 MMOL/L — SIGNIFICANT CHANGE UP (ref 135–145)
WBC # BLD: 10.91 K/UL — HIGH (ref 3.8–10.5)
WBC # FLD AUTO: 10.91 K/UL — HIGH (ref 3.8–10.5)

## 2025-08-01 PROCEDURE — 99232 SBSQ HOSP IP/OBS MODERATE 35: CPT

## 2025-08-01 RX ORDER — OXYCODONE HYDROCHLORIDE 30 MG/1
1 TABLET ORAL
Qty: 0 | Refills: 0 | DISCHARGE
Start: 2025-08-01

## 2025-08-01 RX ORDER — ACETAMINOPHEN 500 MG/5ML
2 LIQUID (ML) ORAL
Qty: 0 | Refills: 0 | DISCHARGE
Start: 2025-08-01

## 2025-08-01 RX ORDER — TAMSULOSIN HYDROCHLORIDE 0.4 MG/1
2 CAPSULE ORAL
Qty: 0 | Refills: 0 | DISCHARGE
Start: 2025-08-01

## 2025-08-01 RX ORDER — MELATONIN 5 MG
1 TABLET ORAL
Qty: 0 | Refills: 0 | DISCHARGE
Start: 2025-08-01

## 2025-08-01 RX ORDER — POLYETHYLENE GLYCOL 3350 17 G/17G
17 POWDER, FOR SOLUTION ORAL
Qty: 0 | Refills: 0 | DISCHARGE
Start: 2025-08-01

## 2025-08-01 RX ADMIN — Medication 300 MILLIGRAM(S): at 11:27

## 2025-08-01 RX ADMIN — ENOXAPARIN SODIUM 40 MILLIGRAM(S): 100 INJECTION SUBCUTANEOUS at 21:13

## 2025-08-01 RX ADMIN — INSULIN LISPRO 4: 100 INJECTION, SOLUTION INTRAVENOUS; SUBCUTANEOUS at 12:37

## 2025-08-01 RX ADMIN — Medication 650 MILLIGRAM(S): at 18:10

## 2025-08-01 RX ADMIN — FINASTERIDE 5 MILLIGRAM(S): 1 TABLET, FILM COATED ORAL at 11:26

## 2025-08-01 RX ADMIN — BUPROPION HYDROBROMIDE 300 MILLIGRAM(S): 522 TABLET, EXTENDED RELEASE ORAL at 11:25

## 2025-08-01 RX ADMIN — MUPIROCIN CALCIUM 1 APPLICATION(S): 20 CREAM TOPICAL at 17:11

## 2025-08-01 RX ADMIN — Medication 1 PATCH: at 07:05

## 2025-08-01 RX ADMIN — TAMSULOSIN HYDROCHLORIDE 0.8 MILLIGRAM(S): 0.4 CAPSULE ORAL at 21:12

## 2025-08-01 RX ADMIN — ATORVASTATIN CALCIUM 10 MILLIGRAM(S): 80 TABLET, FILM COATED ORAL at 21:12

## 2025-08-01 RX ADMIN — MUPIROCIN CALCIUM 1 APPLICATION(S): 20 CREAM TOPICAL at 05:59

## 2025-08-01 RX ADMIN — Medication 650 MILLIGRAM(S): at 17:10

## 2025-08-01 RX ADMIN — VENLAFAXINE HYDROCHLORIDE 150 MILLIGRAM(S): 37.5 CAPSULE, EXTENDED RELEASE ORAL at 11:27

## 2025-08-01 RX ADMIN — Medication 1 PATCH: at 20:02

## 2025-08-01 RX ADMIN — METOPROLOL SUCCINATE 100 MILLIGRAM(S): 50 TABLET, EXTENDED RELEASE ORAL at 05:59

## 2025-08-02 ENCOUNTER — TRANSCRIPTION ENCOUNTER (OUTPATIENT)
Age: 70
End: 2025-08-02

## 2025-08-02 VITALS
DIASTOLIC BLOOD PRESSURE: 74 MMHG | OXYGEN SATURATION: 95 % | RESPIRATION RATE: 18 BRPM | TEMPERATURE: 98 F | SYSTOLIC BLOOD PRESSURE: 109 MMHG | HEART RATE: 73 BPM

## 2025-08-02 LAB
GLUCOSE BLDC GLUCOMTR-MCNC: 119 MG/DL — HIGH (ref 70–99)
GLUCOSE BLDC GLUCOMTR-MCNC: 139 MG/DL — HIGH (ref 70–99)

## 2025-08-02 PROCEDURE — 81001 URINALYSIS AUTO W/SCOPE: CPT

## 2025-08-02 PROCEDURE — 97161 PT EVAL LOW COMPLEX 20 MIN: CPT

## 2025-08-02 PROCEDURE — 85025 COMPLETE CBC W/AUTO DIFF WBC: CPT

## 2025-08-02 PROCEDURE — 82550 ASSAY OF CK (CPK): CPT

## 2025-08-02 PROCEDURE — 93005 ELECTROCARDIOGRAM TRACING: CPT

## 2025-08-02 PROCEDURE — 87077 CULTURE AEROBIC IDENTIFY: CPT

## 2025-08-02 PROCEDURE — 86850 RBC ANTIBODY SCREEN: CPT

## 2025-08-02 PROCEDURE — 82962 GLUCOSE BLOOD TEST: CPT

## 2025-08-02 PROCEDURE — 76000 FLUOROSCOPY <1 HR PHYS/QHP: CPT

## 2025-08-02 PROCEDURE — 96374 THER/PROPH/DIAG INJ IV PUSH: CPT

## 2025-08-02 PROCEDURE — 87640 STAPH A DNA AMP PROBE: CPT

## 2025-08-02 PROCEDURE — 86900 BLOOD TYPING SEROLOGIC ABO: CPT

## 2025-08-02 PROCEDURE — 80053 COMPREHEN METABOLIC PANEL: CPT

## 2025-08-02 PROCEDURE — 85730 THROMBOPLASTIN TIME PARTIAL: CPT

## 2025-08-02 PROCEDURE — 72125 CT NECK SPINE W/O DYE: CPT

## 2025-08-02 PROCEDURE — 87086 URINE CULTURE/COLONY COUNT: CPT

## 2025-08-02 PROCEDURE — 80202 ASSAY OF VANCOMYCIN: CPT

## 2025-08-02 PROCEDURE — 84100 ASSAY OF PHOSPHORUS: CPT

## 2025-08-02 PROCEDURE — 85610 PROTHROMBIN TIME: CPT

## 2025-08-02 PROCEDURE — 87186 SC STD MICRODIL/AGAR DIL: CPT

## 2025-08-02 PROCEDURE — 85027 COMPLETE CBC AUTOMATED: CPT

## 2025-08-02 PROCEDURE — 99285 EMERGENCY DEPT VISIT HI MDM: CPT | Mod: 25

## 2025-08-02 PROCEDURE — 72040 X-RAY EXAM NECK SPINE 2-3 VW: CPT

## 2025-08-02 PROCEDURE — 97530 THERAPEUTIC ACTIVITIES: CPT

## 2025-08-02 PROCEDURE — 93970 EXTREMITY STUDY: CPT

## 2025-08-02 PROCEDURE — 87040 BLOOD CULTURE FOR BACTERIA: CPT

## 2025-08-02 PROCEDURE — 97116 GAIT TRAINING THERAPY: CPT

## 2025-08-02 PROCEDURE — 36415 COLL VENOUS BLD VENIPUNCTURE: CPT

## 2025-08-02 PROCEDURE — C1713: CPT

## 2025-08-02 PROCEDURE — 86901 BLOOD TYPING SEROLOGIC RH(D): CPT

## 2025-08-02 PROCEDURE — 87641 MR-STAPH DNA AMP PROBE: CPT

## 2025-08-02 PROCEDURE — 99239 HOSP IP/OBS DSCHRG MGMT >30: CPT

## 2025-08-02 PROCEDURE — 83735 ASSAY OF MAGNESIUM: CPT

## 2025-08-02 PROCEDURE — C1889: CPT

## 2025-08-02 PROCEDURE — 83036 HEMOGLOBIN GLYCOSYLATED A1C: CPT

## 2025-08-02 PROCEDURE — 80048 BASIC METABOLIC PNL TOTAL CA: CPT

## 2025-08-02 PROCEDURE — 72141 MRI NECK SPINE W/O DYE: CPT

## 2025-08-02 RX ORDER — SULFAMETHOXAZOLE/TRIMETHOPRIM 800-160 MG
1 TABLET ORAL
Qty: 0 | Refills: 0 | DISCHARGE
Start: 2025-08-02

## 2025-08-02 RX ORDER — SULFAMETHOXAZOLE/TRIMETHOPRIM 800-160 MG
1 TABLET ORAL EVERY 12 HOURS
Refills: 0 | Status: DISCONTINUED | OUTPATIENT
Start: 2025-08-02 | End: 2025-08-02

## 2025-08-02 RX ADMIN — METOPROLOL SUCCINATE 100 MILLIGRAM(S): 50 TABLET, EXTENDED RELEASE ORAL at 05:24

## 2025-08-02 RX ADMIN — Medication 650 MILLIGRAM(S): at 13:34

## 2025-08-02 RX ADMIN — VENLAFAXINE HYDROCHLORIDE 150 MILLIGRAM(S): 37.5 CAPSULE, EXTENDED RELEASE ORAL at 08:40

## 2025-08-02 RX ADMIN — Medication 1 TABLET(S): at 12:31

## 2025-08-02 RX ADMIN — Medication 300 MILLIGRAM(S): at 08:40

## 2025-08-02 RX ADMIN — MUPIROCIN CALCIUM 1 APPLICATION(S): 20 CREAM TOPICAL at 05:40

## 2025-08-02 RX ADMIN — Medication 650 MILLIGRAM(S): at 02:15

## 2025-08-02 RX ADMIN — BUPROPION HYDROBROMIDE 300 MILLIGRAM(S): 522 TABLET, EXTENDED RELEASE ORAL at 09:32

## 2025-08-02 RX ADMIN — Medication 1 PATCH: at 07:25

## 2025-08-02 RX ADMIN — Medication 650 MILLIGRAM(S): at 12:34

## 2025-08-02 RX ADMIN — Medication 650 MILLIGRAM(S): at 03:15

## 2025-08-02 RX ADMIN — FINASTERIDE 5 MILLIGRAM(S): 1 TABLET, FILM COATED ORAL at 08:41

## 2025-08-18 ENCOUNTER — NON-APPOINTMENT (OUTPATIENT)
Age: 70
End: 2025-08-18

## 2025-08-18 ENCOUNTER — APPOINTMENT (OUTPATIENT)
Dept: NEUROSURGERY | Facility: CLINIC | Age: 70
End: 2025-08-18

## 2025-08-18 VITALS
HEART RATE: 67 BPM | HEIGHT: 67 IN | BODY MASS INDEX: 25.43 KG/M2 | SYSTOLIC BLOOD PRESSURE: 128 MMHG | DIASTOLIC BLOOD PRESSURE: 83 MMHG | WEIGHT: 162 LBS | RESPIRATION RATE: 16 BRPM | OXYGEN SATURATION: 97 %

## 2025-08-18 DIAGNOSIS — M54.12 RADICULOPATHY, CERVICAL REGION: ICD-10-CM

## 2025-08-19 PROBLEM — M54.12 RADICULOPATHY, CERVICAL REGION: Status: ACTIVE | Noted: 2025-08-19

## 2025-09-11 ENCOUNTER — APPOINTMENT (OUTPATIENT)
Age: 70
End: 2025-09-11

## (undated) DEVICE — Device

## (undated) DEVICE — VENODYNE/SCD SLEEVE CALF MEDIUM

## (undated) DEVICE — DRAPE MAYO STAND 30"

## (undated) DEVICE — DRAPE MICROSCOPE ZEISS OPMI VISIONGUARD 154 X 52"

## (undated) DEVICE — GLV 8 PROTEXIS (BLUE)

## (undated) DEVICE — ELCTR GROUNDING PAD ADULT COVIDIEN

## (undated) DEVICE — POSITIONER FOAM EGG CRATE ULNAR 2PCS (PINK)

## (undated) DEVICE — SUT MONOSOF 2-0 18" C-15

## (undated) DEVICE — NDL SPINAL 18G X 3.5" (PINK)

## (undated) DEVICE — WARMING BLANKET LOWER ADULT

## (undated) DEVICE — SYR LUER LOK 20CC

## (undated) DEVICE — FRAZIER SUCTION TIP 18FR

## (undated) DEVICE — MIDAS REX MR8 MATCH HEAD FLUTED LG BORE 3MM X 14CM

## (undated) DEVICE — FRAZIER SUCTION TIP 12FR

## (undated) DEVICE — SOL IRR POUR H2O 250ML

## (undated) DEVICE — SUCTION CHICAGO TIP 12FR

## (undated) DEVICE — DRAPE C ARM UNIVERSAL

## (undated) DEVICE — NEURO SURGICAL STRIP 1/4 X 6"

## (undated) DEVICE — MIDAS REX MR7 LUBRICANT DIFFUSER CARTRIDGE

## (undated) DEVICE — SUT VICRYL PLUS 3-0 18" X-1 (POP-OFF)

## (undated) DEVICE — STAPLER SKIN VISI-STAT 35 WIDE

## (undated) DEVICE — NEURO SURGICAL STRIP 1/2 X 6"

## (undated) DEVICE — TUBING BIPOLAR IRRIGATOR AND CORD SET

## (undated) DEVICE — DRAPE TOWEL BLUE 17" X 24"

## (undated) DEVICE — DRAPE MICROSCOPE OPMI VISIONGUARD 48X118"

## (undated) DEVICE — BUR STRYKER PRECISION ROUND 5MM

## (undated) DEVICE — FRAZIER SUCTION TIP 8FR

## (undated) DEVICE — SPECIMEN CONTAINER 100ML

## (undated) DEVICE — SUT BIOSYN 4-0 18" P-12

## (undated) DEVICE — DRSG DERMABOND PRINEO 60CM

## (undated) DEVICE — SUT ETHILON 3-0 30" FS-1

## (undated) DEVICE — PREP DURAPREP 26CC

## (undated) DEVICE — DRAPE 1/2 SHEET 40X57"

## (undated) DEVICE — SUT SOFSILK 2-0 18" TIES

## (undated) DEVICE — SET LEAD FLYING TUBE AND CORD RP

## (undated) DEVICE — SOL IRR POUR NS 0.9% 500ML

## (undated) DEVICE — MEDICATION LABELS W MARKER

## (undated) DEVICE — PACK UNIVERSAL DRAPE

## (undated) DEVICE — SUT VICRYL PLUS 3-0 18" SH UNDYED (POP-OFF)

## (undated) DEVICE — BIPOLAR FORCEP SYMMETRY BAYONET 7" X 1.5MM SMOOTH (SILVER)

## (undated) DEVICE — PACK LUMBAR LAMI

## (undated) DEVICE — CATH IV SAFE INSYTE 14G X 1.75" (ORANGE)

## (undated) DEVICE — SPONGE PEANUT AUTO COUNT

## (undated) DEVICE — DRAPE 3/4 SHEET W REINFORCEMENT 56X77"

## (undated) DEVICE — FOLEY TRAY 16FR LF URINE METER SURESTEP